# Patient Record
Sex: MALE | Race: WHITE | Employment: FULL TIME | URBAN - METROPOLITAN AREA
[De-identification: names, ages, dates, MRNs, and addresses within clinical notes are randomized per-mention and may not be internally consistent; named-entity substitution may affect disease eponyms.]

---

## 2017-01-05 ENCOUNTER — OFFICE VISIT (OUTPATIENT)
Dept: PSYCHIATRY | Facility: CLINIC | Age: 37
End: 2017-01-05
Attending: CLINICAL NURSE SPECIALIST
Payer: COMMERCIAL

## 2017-01-05 VITALS
HEART RATE: 62 BPM | SYSTOLIC BLOOD PRESSURE: 129 MMHG | DIASTOLIC BLOOD PRESSURE: 85 MMHG | BODY MASS INDEX: 28.9 KG/M2 | WEIGHT: 219 LBS

## 2017-01-05 DIAGNOSIS — F45.9 SOMATIC PREOCCUPATION: ICD-10-CM

## 2017-01-05 DIAGNOSIS — F33.2 MAJOR DEPRESSIVE DISORDER, RECURRENT, SEVERE WITHOUT PSYCHOTIC FEATURES (H): Primary | ICD-10-CM

## 2017-01-05 DIAGNOSIS — F41.9 ANXIETY: ICD-10-CM

## 2017-01-05 DIAGNOSIS — F90.0 ADHD (ATTENTION DEFICIT HYPERACTIVITY DISORDER), INATTENTIVE TYPE: ICD-10-CM

## 2017-01-05 RX ORDER — BUPROPION HYDROCHLORIDE 150 MG/1
150 TABLET, EXTENDED RELEASE ORAL EVERY MORNING
Qty: 30 TABLET | Refills: 5 | Status: SHIPPED | OUTPATIENT
Start: 2017-01-05 | End: 2017-06-15

## 2017-01-05 RX ORDER — OLANZAPINE 2.5 MG/1
2.5 TABLET, FILM COATED ORAL AT BEDTIME
Qty: 30 TABLET | Refills: 1 | Status: SHIPPED | OUTPATIENT
Start: 2017-01-05 | End: 2017-02-02

## 2017-01-05 NOTE — PROGRESS NOTES
Outpatient Psychiatry Progress Note     Provider: ABBY Contreras CNS  Date: 2017  Service:  Medication follow up with counseling.   Patient Identification: Brian Gaitan  : 1980   MRN: [id number]    Brian Gaitan is a 36 year old year old male who presents for ongoing psychiatric care.  Brian Gaitan was last seen in clinic on 12/15/16.   At that time,   Assessment & Plan       Brian Gaitan is seen today for follow up and reports overall continues to be depressed, lethargic, with difficulty processing but also has some improvement in anxiety with less somatic focus and internal focus.  He is wondering about pursuing low testosterone treatment.    Diagnosis  Axis 1: Major Depression, Recurrent severe without psychosis, Anxiety, Somatic Preoccupations, History of ADHD   Axis 2: none  Axis 3: See problem list in the medical record    Plan:  Medication: Decrease Trazodone to one half tablet for 2 weeks then if no worsening of sleep discontinue. Continue all other medications.  OTC Recommendations: Twice Daily Multiple Vitamin  Lab Orders:  none  Referrals: none  Release of Information: none  Future Treatment Considerations:addition of Fluoxetine  Return for Follow Up:2 weeks               2017  Today Brian reports he was with family for [employer] and a few friends in on new years.  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  He stays with family about 70% of the time.   Got a  fitbit for [employer] and it says he has been sleeping 9 hours. He can't tell that he sleeps like this though.  Not feeling that he is sleeping worse since off Trazodone.  If he sees something sad can get teary.  The increase in emotions seems to have been gradual onset.   Still has trouble getting up in the am. Sometimes sleeps into noon.  Side effects of medication include: no change  Psychiatric Review of Systems:  The patient endorses symptoms of depression: In the last 2 weeks several days appetite  disturbance, restless/lethargy. More than 1/2 days feeling failure, SI but denies plan or intent. Nearly everyday anhedonia, feeling depressed, sleep disturbance, fatigue, concentration problems.  He  patient endorses symptoms of anxiety : less somatic focused but still has this. He is considering treatment for low testosterone.  He endorses symptoms of pierre including none.    He endorses symptoms of psychosis including no psychotic symptoms.       Review of Medical Systems:  Sleep: see subjective  Energy: continued low  Concentration: continued decrease  Appetite: eating more  GI Concerns: none  Cardiac concerns: none  Neurological concerns: none  Other medical concerns: no new concerns  Current Substance Use:  Alcohol:denies frequent use or abuse  Other drugs:denies  Caffeine:not discussed  Nicotine: none  Past Medical History:   Past Medical History   Diagnosis Date     Perforation of tympanic membrane, unspecified age 23     Generalized anxiety disorder      Insomnia      Major depressive disorder, recurrent episode, moderate (H) 9/1/2011     Bundle branch block, right      Chronic back pain      Seasonal allergies      Patient Active Problem List   Diagnosis     CARDIOVASCULAR SCREENING; LDL GOAL LESS THAN 160     Sleep problems     Cognitive complaints     ADHD (attention deficit hyperactivity disorder), inattentive type     Anxiety     Major depressive disorder, recurrent, severe without psychotic features (H)     Right bundle branch block (RBBB) on electrocardiogram (ECG)     Somatic preoccupation     Sinus congestion     Suicidal ideation       Allergies:   Allergies   Allergen Reactions     Penicillins Unknown          Current Medications     Current Outpatient Prescriptions Ordered in Epic   Medication Sig Dispense Refill     OLANZapine (ZYPREXA) 5 MG tablet Take 1 tablet (5 mg) by mouth At Bedtime 30 tablet 5     lithium (ESKALITH/LITHOBID) 300 MG CR tablet Take 3 tablets (900 mg) by mouth daily With  "food. 90 tablet 5     prazosin (MINIPRESS) 5 MG capsule Take 1 capsule (5 mg) by mouth At Bedtime 30 capsule 5     traZODone (DESYREL) 50 MG tablet Take one half to one tablet by mouth an hour before bedtime 30 tablet 0     buPROPion (WELLBUTRIN SR) 150 MG 12 hr tablet Take 1 tablet (150 mg) by mouth every morning 30 tablet 0     propranolol (INDERAL) 20 MG tablet Take 1 tablet (20 mg) by mouth 2 times daily       pseudoePHEDrine (SUDAFED) 30 MG tablet Take 1 tablet (30 mg) by mouth as needed for congestion       acetaminophen (TYLENOL) 325 MG tablet Take 2 tablets (650 mg) by mouth every 4 hours as needed for mild pain or headaches 100 tablet 0     miconazole (MICATIN) 2 % cream Apply topically 2 times daily as needed (rash) 45 g 0     cholecalciferol 1000 UNITS TABS Take 1,000 Units by mouth daily 30 tablet 0     fluticasone (FLONASE) 50 MCG/ACT nasal spray Spray 2 sprays into both nostrils daily 16 g 5     fexofenadine-pseudoePHEDrine (ALLEGRA-D)  MG per tablet Take 1 tablet by mouth 2 times daily       MAGNESIUM GLYCINATE PLUS PO Take 400 mg by mouth daily       Multiple Vitamins-Minerals (MULTIVITAMIN PO) Take by mouth daily       [DISCONTINUED] QUEtiapine (SEROQUEL) 25 MG tablet Take 2-4 tablets at bedtime to address insomnia and sleep concerns  Take 1 tablet twice daily as needed for anxiety management 100 tablet 0     Omega-3 Fatty Acids (OMEGA-3 FISH OIL PO) Take 1 g by mouth daily       No current Epic-ordered facility-administered medications on file.        Mental Status Exam     Appearance:  Casually dressed and Adequately groomed  Behavior/relationship to examiner/demeanor:  Cooperative  Orientation: Oriented to person, place, time and situation  Psychomotor: normal form  Speech Rate:  Normal  Speech Spontaneity:  Normal  Mood:  \"still depressed\"  Affect:  Appropriate/mood-congruent  Thought Process (Associations):  Goal directed and Circumstantial  Thought Content:  no overt psychosis, patient " does not appear to be responding to internal stimuli, Suicidal ideation and denies suicidal intent or plan  Abnormal Perception:  None  Attention/Concentration:  Fair  Language:  Intact  Insight:  Adequate  Judgment:  Adequate for safety      Results     Vital signs: /85 mmHg  Pulse 62  Wt 99.338 kg (219 lb)    Laboratory Data:  reviewed previous    Assessment & Plan      Brian Gaitan is seen today for follow up and reports he continues to have a lot of difficulty but is also thinking more of things he can do to have more structure. Has concerns about side effects of lithium but we discussed how this medication may also be helping him. Agrees with plan to further decrease Zyprexa to see if easier to get going in the am and more energy during the day.     Diagnosis  Axis 1: Major Depression, Recurrent, Severe without psychotic features, Anxiety, ADHD inattentive, Somatic Preoccupations  Axis 2: none  Axis 3: See problem list in the medical record    Plan:  Medication: Decrease Zyprexa to 2.5mg and continue all other medications  OTC Recommendations: none  Lab Orders: Continues   Referrals: none  Release of Information: none  Future Treatment Considerations:per symptoms  Return for Follow Up: 4 weeks   The risks, benefits, alternatives and side effects have been discussed and are understood by the patient. The patient understands the risks of using street drugs or alcohol. There are no medical contraindications, the patient agrees to treatment, and has the capacity to do so. The patient understands to call 911 or come to the nearest ED if life threatening or urgent symptoms present.  Over 50% of this time was spent counseling the patient and/or coordinating care regarding review of social and occupational functioning.  In addition patient was counseled on health and wellness practices to augment medication treatment of symptoms. See note for details.    Yenny Call, APRN CNS 1/5/2017

## 2017-01-07 ASSESSMENT — PATIENT HEALTH QUESTIONNAIRE - PHQ9: SUM OF ALL RESPONSES TO PHQ QUESTIONS 1-9: 21

## 2017-02-02 ENCOUNTER — OFFICE VISIT (OUTPATIENT)
Dept: PSYCHIATRY | Facility: CLINIC | Age: 37
End: 2017-02-02
Attending: CLINICAL NURSE SPECIALIST
Payer: COMMERCIAL

## 2017-02-02 VITALS
BODY MASS INDEX: 28.31 KG/M2 | DIASTOLIC BLOOD PRESSURE: 75 MMHG | SYSTOLIC BLOOD PRESSURE: 112 MMHG | HEART RATE: 67 BPM | WEIGHT: 214.5 LBS

## 2017-02-02 DIAGNOSIS — F45.9 SOMATIC PREOCCUPATION: ICD-10-CM

## 2017-02-02 DIAGNOSIS — F33.2 MAJOR DEPRESSIVE DISORDER, RECURRENT, SEVERE WITHOUT PSYCHOTIC FEATURES (H): ICD-10-CM

## 2017-02-02 DIAGNOSIS — F41.9 ANXIETY: Primary | ICD-10-CM

## 2017-02-02 NOTE — PROGRESS NOTES
Outpatient Psychiatry Progress Note     Provider: ABBY Contreras CNS  Date: 2017  Service:  Medication follow up with counseling.   Patient Identification: Brian Gaitan  : 1980   MRN: [id number]    Brian Gaitan is a 36 year old year old male who presents for ongoing psychiatric care.  Brian Gaitan was last seen in clinic on 17.   At that time,   Assessment & Plan       Brian Gaitan is seen today for follow up and reports he continues to have a lot of difficulty but is also thinking more of things he can do to have more structure. Has concerns about side effects of lithium but we discussed how this medication may also be helping him. Agrees with plan to further decrease Zyprexa to see if easier to get going in the am and more energy during the day.     Diagnosis  Axis 1: Major Depression, Recurrent, Severe without psychotic features, Anxiety, ADHD inattentive, Somatic Preoccupations  Axis 2: none  Axis 3: See problem list in the medical record    Plan:  Medication: Decrease Zyprexa to 2.5mg and continue all other medications  OTC Recommendations: none  Lab Orders: Continues   Referrals: none  Release of Information: none  Future Treatment Considerations:per symptoms  Return for Follow Up: 4 weeks               2017  Today Brian reports that he is feeling about the same. Sleep is unchanged, but continues to have difficulty getting out of bed. Gets out of bed around noon. Spends 70 to 80 % of time at his parents.  Continues to see Katie but only every 2 weeks due to finances. Due to back pain has been seeing chiropractor twice a week due to increase pain. He did start volunteering at an animal shelter but only about an hour every few weeks. Has been ice fishing. Continue to have difficulties with concentration.  Random thoughts of suicidal ideas but denies intent.   Side effects of medication include: no new side effects    Psychiatric Review of Systems:  The patient endorses  symptoms of depression: decreased energy, concentration, avoidance  In the last 2 weeks per PHQ 9 several days feelings of failure, restless/lethargy.  Nearly everyday anhedonia, feeling depressed, sleep disturbance, fatigue, concentration problems. SI but denies plan or intent.  He  patient endorses symptoms of anxiety : more somatic symptoms  He endorses symptoms of pierre including denies.    He endorses symptoms of psychosis including no psychotic symptoms.       Review of Medical Systems:  Sleep: not sure how much he is getting  Energy: low  Concentration: continued problems  Appetite: trying to   GI Concerns: denies  Cardiac concerns: denies  Neurological concerns: denies  Other medical concerns: denies    Current Substance Use:  Alcohol:occasional use  Other drugs:denies  Caffeine:denies  Nicotine: denies    Past Medical History:   Past Medical History   Diagnosis Date     Perforation of tympanic membrane, unspecified age 23     Generalized anxiety disorder      Insomnia      Major depressive disorder, recurrent episode, moderate (H) 9/1/2011     Bundle branch block, right      Chronic back pain      Seasonal allergies      Patient Active Problem List   Diagnosis     CARDIOVASCULAR SCREENING; LDL GOAL LESS THAN 160     Sleep problems     Cognitive complaints     ADHD (attention deficit hyperactivity disorder), inattentive type     Anxiety     Major depressive disorder, recurrent, severe without psychotic features (H)     Right bundle branch block (RBBB) on electrocardiogram (ECG)     Somatic preoccupation     Sinus congestion     Suicidal ideation       Allergies:   Allergies   Allergen Reactions     Penicillins Unknown          Current Medications     Current Outpatient Prescriptions Ordered in [facility]   Medication Sig Dispense Refill     OLANZapine (ZYPREXA) 2.5 MG tablet Take 1 tablet (2.5 mg) by mouth At Bedtime 30 tablet 1     buPROPion (WELLBUTRIN SR) 150 MG 12 hr tablet Take 1 tablet (150 mg) by mouth  "every morning 30 tablet 5     lithium (ESKALITH/LITHOBID) 300 MG CR tablet Take 3 tablets (900 mg) by mouth daily With food. 90 tablet 5     prazosin (MINIPRESS) 5 MG capsule Take 1 capsule (5 mg) by mouth At Bedtime 30 capsule 5     propranolol (INDERAL) 20 MG tablet Take 1 tablet (20 mg) by mouth 2 times daily       pseudoePHEDrine (SUDAFED) 30 MG tablet Take 1 tablet (30 mg) by mouth as needed for congestion       acetaminophen (TYLENOL) 325 MG tablet Take 2 tablets (650 mg) by mouth every 4 hours as needed for mild pain or headaches 100 tablet 0     miconazole (MICATIN) 2 % cream Apply topically 2 times daily as needed (rash) 45 g 0     cholecalciferol 1000 UNITS TABS Take 1,000 Units by mouth daily 30 tablet 0     fluticasone (FLONASE) 50 MCG/ACT nasal spray Spray 2 sprays into both nostrils daily 16 g 5     fexofenadine-pseudoePHEDrine (ALLEGRA-D)  MG per tablet Take 1 tablet by mouth 2 times daily       MAGNESIUM GLYCINATE PLUS PO Take 400 mg by mouth daily       Multiple Vitamins-Minerals (MULTIVITAMIN PO) Take by mouth daily       [DISCONTINUED] QUEtiapine (SEROQUEL) 25 MG tablet Take 2-4 tablets at bedtime to address insomnia and sleep concerns  Take 1 tablet twice daily as needed for anxiety management 100 tablet 0     Omega-3 Fatty Acids (OMEGA-3 FISH OIL PO) Take 1 g by mouth daily       No current Epic-ordered facility-administered medications on file.        Mental Status Exam     Appearance:  Casually dressed  Behavior/relationship to examiner/demeanor:  Cooperative and Pleasant  Orientation: Oriented to person, place, time and situation  Psychomotor: normal form  Speech Rate:  Normal  Speech Spontaneity:  Normal  Mood:  \"still not good\"  Affect:  Appropriate/mood-congruent  Thought Process (Associations):  Circumstantial  Thought Content:  no overt psychosis, denies current suicidal ideation, intent or thoughts and patient does not appear to be responding to internal stimuli  Abnormal " Perception:  None  Attention/Concentration:  Fair  Language:  Intact  Insight:  Adequate  Judgment:  Adequate for safety      Results     Vital signs: /75 mmHg  Pulse 67  Wt 97.297 kg (214 lb 8 oz)    Laboratory Data:  no new results reviewed since last visit    Assessment & Plan      Brian Gaitan is seen today for follow up and reports continued low energy, difficulties getting out of bed.  Pt is engaging in some outside activities, but encouraged to consider Yoga, walking dog to increase activities outside of home.  Pt agrees to discontinue Olanzapine to see if there is any changes in energy.  If symptoms of depressed mood worsens, may restart Prozac in the future.    Diagnosis  Axis 1: Major Depression, Recurrent, Severe without psychotic features, Anxiety, ADHD inattentive, Somatic Preoccupations  Axis 2: none  Axis 3: See problem list in the medical record    Plan:  Medication: Discontinue Zyprexa. Continue all other medications  OTC Recommendations: none  Lab Orders:  none  Referrals: none  Release of Information: none  Future Treatment Considerations:addition of Fluoxetine  Return for Follow Up: 4 weeks   The risks, benefits, alternatives and side effects have been discussed and are understood by the patient. The patient understands the risks of using street drugs or alcohol. There are no medical contraindications, the patient agrees to treatment, and has the capacity to do so. The patient understands to call 911 or come to the nearest ED if life threatening or urgent symptoms present.  Over 50% of this time was spent counseling the patient and/or coordinating care regarding review of social and occupational functioning.  In addition patient was counseled on health and wellness practices to augment medication treatment of symptoms. See note for details.    Nichelle Slater, Psychiatric/Mental Health Nurse Practitioner Student, 2/4/2017    Yenny Call, APRN CNS 2/2/2017

## 2017-02-09 ASSESSMENT — PATIENT HEALTH QUESTIONNAIRE - PHQ9: SUM OF ALL RESPONSES TO PHQ QUESTIONS 1-9: 20

## 2017-03-02 ENCOUNTER — OFFICE VISIT (OUTPATIENT)
Dept: PSYCHIATRY | Facility: CLINIC | Age: 37
End: 2017-03-02
Attending: CLINICAL NURSE SPECIALIST
Payer: COMMERCIAL

## 2017-03-02 VITALS
DIASTOLIC BLOOD PRESSURE: 78 MMHG | SYSTOLIC BLOOD PRESSURE: 130 MMHG | BODY MASS INDEX: 28.39 KG/M2 | WEIGHT: 215.2 LBS | HEART RATE: 65 BPM

## 2017-03-02 DIAGNOSIS — F95.9 TIC DISORDER: ICD-10-CM

## 2017-03-02 DIAGNOSIS — F41.9 ANXIETY: ICD-10-CM

## 2017-03-02 DIAGNOSIS — F33.2 MAJOR DEPRESSIVE DISORDER, RECURRENT, SEVERE WITHOUT PSYCHOTIC FEATURES (H): ICD-10-CM

## 2017-03-02 DIAGNOSIS — F90.0 ADHD (ATTENTION DEFICIT HYPERACTIVITY DISORDER), INATTENTIVE TYPE: Primary | ICD-10-CM

## 2017-03-02 DIAGNOSIS — F45.9 SOMATIC PREOCCUPATION: ICD-10-CM

## 2017-03-02 PROCEDURE — 99212 OFFICE O/P EST SF 10 MIN: CPT | Mod: ZF

## 2017-03-02 RX ORDER — OLANZAPINE 5 MG/1
5 TABLET ORAL AT BEDTIME
Qty: 30 TABLET | Refills: 5 | Status: SHIPPED | OUTPATIENT
Start: 2017-03-02 | End: 2017-08-24

## 2017-03-02 NOTE — PROGRESS NOTES
"  Outpatient Psychiatry Progress Note     Provider: ABBY Contreras CNS  Date: 3/2/2017  Service:  Medication follow up with counseling.   Patient Identification: Brian Gaitan  : 1980   MRN: [id number]    Brian Gaitan is a 36 year old year old male who presents for ongoing psychiatric care.  Brian Gaitan was last seen in clinic on 17.   At that time,   Assessment & Plan      Brian Gaitan is seen today for follow up and reports continued low energy, difficulties getting out of bed. Pt is engaging in some outside activities, but encouraged to consider Yoga, walking dog to increase activities outside of home. Pt agrees to discontinue Olanzapine to see if there is any changes in energy. If symptoms of depressed mood worsens, may restart Prozac in the future.     Diagnosis  Axis 1: Major Depression, Recurrent, Severe without psychotic features, Anxiety, ADHD inattentive, Somatic Preoccupations  Axis 2: none  Axis 3: See problem list in the medical record     Plan:  Medication: Discontinue Zyprexa. Continue all other medications  OTC Recommendations: none  Lab Orders: none  Referrals: none  Release of Information: none  Future Treatment Considerations:addition of Fluoxetine  Return for Follow Up: 4 weeksb      3/2/2017  Today Brian reports continued depressed mood and isolation.  Pt reports tearfully having more suicidal thoughts and is feeling \" tired off it all\".  Denies any intent.  Continues to have back pain. Feels safer staying at his parent's home in [place].  No improvement in sleep, fatigue,motivation or energy. Goes to bed around between midnight and 2 am, usually watching videos.  Helping his father work on the basement since it been getting water.  Pt reports he feels better staying with family, but also has ambivalent feeling/increased worthlessness due to living with family at his age.  Also, still volunteer work at the animal shelter.  However, reports not hanging out with friends " much and also noticed not cooking for himself due to decrease in motivation.  Pt also reports some obsessive thoughts about not being well.  Since off Olanzapine has been getting hot flash like symtpoms; hot, sweaty and other times cold and can't get warm.     Reports increased financial stress due to accumulating health care cost. Appointments here are costing $100 until he meets his out of pocket expenses. Wasn't able to get in with therapist this week due to her schedule, but this is additional financial stress.      Side effects of medication include: hot flash like symptoms since being off of Olanzapine    Psychiatric Review of Systems:  The patient endorses symptoms of depression: In the last 2 weeks per PHQ 9 several days several day appetite disturbance, restless/lethargy. More than 1/2 days feelings of failure.  Nearly everyday anhedonia, feeling depressed, sleep disturbance, fatigue, concentration problems, SI but denies plan or intent.   He  patient endorses symptoms of anxiety : obsessive thoughts about not being well  He endorses symptoms of pierre including denies.    He endorses symptoms of psychosis including no psychotic symptoms.       Review of Medical Systems:  Sleep: Goes to bed between midnight and 2 am.  Energy: decreased  Concentration: no change  Appetite: decreased  GI Concerns: denies  Cardiac concerns: denies  Neurological concerns: denies  Other medical concerns: denies    Current Substance Use:  Alcohol:no change  Other drugs:denies  Caffeine:denies  Nicotine: denies    Past Medical History:   Past Medical History   Diagnosis Date     Bundle branch block, right      Chronic back pain      Generalized anxiety disorder      Insomnia      Major depressive disorder, recurrent episode, moderate (H) 9/1/2011     Perforation of tympanic membrane, unspecified age 23     Seasonal allergies      Patient Active Problem List   Diagnosis     CARDIOVASCULAR SCREENING; LDL GOAL LESS THAN 160     Sleep  problems     Cognitive complaints     ADHD (attention deficit hyperactivity disorder), inattentive type     Anxiety     Major depressive disorder, recurrent, severe without psychotic features (H)     Right bundle branch block (RBBB) on electrocardiogram (ECG)     Somatic preoccupation     Sinus congestion     Suicidal ideation       Allergies:   Allergies   Allergen Reactions     Penicillins Unknown          Current Medications     Current Outpatient Prescriptions Ordered in [facility]   Medication Sig Dispense Refill     buPROPion (WELLBUTRIN SR) 150 MG 12 hr tablet Take 1 tablet (150 mg) by mouth every morning 30 tablet 5     lithium (ESKALITH/LITHOBID) 300 MG CR tablet Take 3 tablets (900 mg) by mouth daily With food. 90 tablet 5     prazosin (MINIPRESS) 5 MG capsule Take 1 capsule (5 mg) by mouth At Bedtime 30 capsule 5     propranolol (INDERAL) 20 MG tablet Take 1 tablet (20 mg) by mouth 2 times daily       pseudoePHEDrine (SUDAFED) 30 MG tablet Take 1 tablet (30 mg) by mouth as needed for congestion       acetaminophen (TYLENOL) 325 MG tablet Take 2 tablets (650 mg) by mouth every 4 hours as needed for mild pain or headaches 100 tablet 0     miconazole (MICATIN) 2 % cream Apply topically 2 times daily as needed (rash) 45 g 0     cholecalciferol 1000 UNITS TABS Take 1,000 Units by mouth daily 30 tablet 0     fluticasone (FLONASE) 50 MCG/ACT nasal spray Spray 2 sprays into both nostrils daily 16 g 5     [DISCONTINUED] QUEtiapine (SEROQUEL) 25 MG tablet Take 2-4 tablets at bedtime to address insomnia and sleep concerns  Take 1 tablet twice daily as needed for anxiety management 100 tablet 0     No current Epic-ordered facility-administered medications on file.         Mental Status Exam     Appearance:  Casually dressed and Adequately groomed  Behavior/relationship to examiner/demeanor:  Cooperative, Engaged and Pleasant  Orientation: Oriented to person, place, time and situation  Psychomotor: normal form  Speech Rate:   Normal  Speech Spontaneity:  Normal  Mood:  depressed  Affect:  Appropriate/mood-congruent, subdued and tearful at times  Thought Process (Associations):  Circumstantial  Thought Content:  no overt psychosis, denies current suicidal ideation, intent or thoughts and patient does not appear to be responding to internal stimuli  Abnormal Perception:  None  Attention/Concentration:  Normal  Language:  Intact  Insight:  Adequate  Judgment:  Adequate for safety      Results     Vital signs: /78  Pulse 65  Wt 97.6 kg (215 lb 3.2 oz)  BMI 28.39 kg/m2    Laboratory Data:  no new data reviewed today    Assessment & Plan      Brian Gaitan is seen today for follow up and reports continued low energy, difficulties with sleeping and increased depressed mood with occasional suicide ideation without intent.  Pt feels safe staying with his family and wants to continue to do so despite of some ambivalence.  Strongly encouraged pt to stay with family as he feels safer with them at this time.  Pt is also reporting increased financial stress due to frequent health visits.  Encouraged to keep in touch via My Chart messages in one week to follow up while follow up face to face in 4 weeks.  Pt agrees to restart Zyprexa as symptoms worsened after discontinuing it.  Pt has tried numerous medications and ECT in the past without much improvement, as pt has not tried Lamictal, Prozac or Luvox, may consider them in the future.    Diagnosis  Axis 1: Major Depression, Recurrent, Severe without psychotic features, Anxiety, ADHD inattentive, Somatic Preoccupations  Axis 2: none  Axis 3: See problem list in the medical record      Plan:  Medication: Restart Zyprexa 5mg and continue all other medications  OTC Recommendations: none  Lab Orders:  None at this time  Referrals: none  Release of Information: none  Future Treatment Considerations:Lamictal, Prozac, luvox  Return for Follow Up:4 weeks but patient will send My Chart message in one week  to consider Lamictal, Prozac, Luvox   The risks, benefits, alternatives and side effects have been discussed and are understood by the patient. The patient understands the risks of using street drugs or alcohol. There are no medical contraindications, the patient agrees to treatment, and has the capacity to do so. The patient understands to call 911 or come to the nearest ED if life threatening or urgent symptoms present.  Over 50% of this time was spent counseling the patient and/or coordinating care regarding review of social and occupational functioning.  In addition patient was counseled on health and wellness practices to augment medication treatment of symptoms. See note for details.    Nichelle Slater, Psychiatric/Mental Health Nurse Practitioner Student, 3/3/2017      Yenny Call, APRN CNS 3/2/2017

## 2017-03-02 NOTE — MR AVS SNAPSHOT
After Visit Summary   3/2/2017    Brian Gaitan    MRN: [id number]           Patient Information     Date Of Birth          1980        Visit Information        Provider Department      3/2/2017 5:15 PM Yenny Call APRN [facility]        Today's Diagnoses     ADHD (attention deficit hyperactivity disorder), inattentive type    -  1    Anxiety        Major depressive disorder, recurrent, severe without psychotic features (H)        Somatic preoccupation        Tic disorder        Major depressive disorder, recurrent, severe without psychotic features           Follow-ups after your visit        Follow-up notes from your care team     Return in about 4 weeks (around 3/30/2017).      Who to contact     Please call your clinic at [phone] to:    Ask questions about your health    Make or cancel appointments    Discuss your medicines    Learn about your test results    Speak to your doctor   If you have compliments or concerns about an experience at your clinic, or if you wish to file a complaint, please contact [facility] Patient Relations at [phone] or email us at [email or web address]         Additional Information About Your Visit        MyChart Information     [employer] gives you secure access to your electronic health record. If you see a primary care provider, you can also send messages to your care team and make appointments. If you have questions, please call your primary care clinic.  If you do not have a primary care provider, please call [phone] and they will assist you.      [employer] is an electronic gateway that provides easy, online access to your medical records. With [employer], you can request a clinic appointment, read your test results, renew a prescription or communicate with your care team.     To access your existing account, please contact your [facility] or call [phone] for  assistance.        Care EveryWhere ID     This is your Care EveryWhere ID. This could be used by other organizations to access your [place] medical records  [id number]        Your Vitals Were     Pulse BMI (Body Mass Index)                65 28.39 kg/m2           Blood Pressure from Last 3 Encounters:   03/02/17 130/78   02/02/17 112/75   01/05/17 129/85    Weight from Last 3 Encounters:   03/02/17 97.6 kg (215 lb 3.2 oz)   02/02/17 97.3 kg (214 lb 8 oz)   01/05/17 99.3 kg (219 lb)              Today, you had the following     No orders found for display         Today's Medication Changes          These changes are accurate as of: 3/2/17 11:59 PM.  If you have any questions, ask your nurse or doctor.               Start taking these medicines.        Dose/Directions    OLANZapine 5 MG tablet   Commonly known as:  zyPREXA   Used for:  Major depressive disorder, recurrent, severe without psychotic features (H)   Started by:  Yenny Call APRN CNS        Dose:  5 mg   Take 1 tablet (5 mg) by mouth At Bedtime   Quantity:  30 tablet   Refills:  5            Where to get your medicines      These medications were sent to [facility] [address] [place], [place] [address] [facility] [address] [facility] [address]  [address] [facility] [address], [facility] [address]     Phone:  [phone]     OLANZapine 5 MG tablet                Primary Care Provider Office Phone # Fax #    ABBY Ng [facility] [phone]       [facility] [place] [address] [place] [address]        Thank you!     Thank you for choosing PSYCHIATRY CLINIC  for your care. Our goal is always to provide you with excellent care. Hearing back from our patients is one way we can continue to improve our services. Please take a few minutes to complete the written survey that you may receive in the mail after your visit with us. Thank you!             Your Updated Medication List - Protect others  around you: Learn how to safely use, store and throw away your medicines at www.disposemymeds.org.          This list is accurate as of: 3/2/17 11:59 PM.  Always use your most recent med list.                   Brand Name Dispense Instructions for use    acetaminophen 325 MG tablet    TYLENOL    100 tablet    Take 2 tablets (650 mg) by mouth every 4 hours as needed for mild pain or headaches       buPROPion 150 MG 12 hr tablet    WELLBUTRIN SR    30 tablet    Take 1 tablet (150 mg) by mouth every morning       cholecalciferol 1000 UNITS Tabs     30 tablet    Take 1,000 Units by mouth daily       fluticasone 50 MCG/ACT spray    FLONASE    16 g    Spray 2 sprays into both nostrils daily       lithium 300 MG CR tablet    ESKALITH/LITHOBID    90 tablet    Take 3 tablets (900 mg) by mouth daily With food.       miconazole 2 % cream    MICATIN    45 g    Apply topically 2 times daily as needed (rash)       OLANZapine 5 MG tablet    zyPREXA    30 tablet    Take 1 tablet (5 mg) by mouth At Bedtime       prazosin 5 MG capsule    MINIPRESS    30 capsule    Take 1 capsule (5 mg) by mouth At Bedtime       propranolol 20 MG tablet    INDERAL     Take 1 tablet (20 mg) by mouth 2 times daily       pseudoePHEDrine 30 MG tablet    SUDAFED     Take 1 tablet (30 mg) by mouth as needed for congestion

## 2017-03-02 NOTE — NURSING NOTE
Chief Complaint   Patient presents with     RECHECK     Anxiety, ADHD     Reviewed allergies, smoking status, and pharmacy preference  Administered abuse screening questions   Obtained weight, blood pressure and heart rate   Soraya Fontanez LPN    During IPV screening, Patient stated he has feelings of harming himself daily. Will notify provider. Soraya Fontanez LPN

## 2017-03-10 ASSESSMENT — PATIENT HEALTH QUESTIONNAIRE - PHQ9: SUM OF ALL RESPONSES TO PHQ QUESTIONS 1-9: 22

## 2017-03-13 ENCOUNTER — CARE COORDINATION (OUTPATIENT)
Dept: PSYCHIATRY | Facility: CLINIC | Age: 37
End: 2017-03-13

## 2017-03-13 NOTE — PROGRESS NOTES
Appt history:  LS  3/2/17  RTC  4 weeks  PEN  not scheduled    At 3/2/17 appt:  -Restart Zyprexa 5mg and continue all other medications  -Return in 4 weeks but patient will send My Chart message in one week to consider Lamictal, Prozac, Luvox    Call placed to pt:  -Pt has not contacted clinic since last appt  -Writer sent [employer] on 3/10/17 which pt has not read  -LVM at [phone] requesting pt call or respond to MyChart msg with an update  -Clinic number provided if pt would prefer to call  -Will try reaching pt tomorrow if no c/b.

## 2017-03-14 NOTE — PROGRESS NOTES
-Attempted to reach pt again today but no answer.  -LVM requesting call back or for pt to respond to MyChart msg  -Clinic number provided for c/b  -Will update provider.

## 2017-03-15 ENCOUNTER — TELEPHONE (OUTPATIENT)
Dept: PSYCHIATRY | Facility: CLINIC | Age: 37
End: 2017-03-15

## 2017-03-15 NOTE — TELEPHONE ENCOUNTER
----- Message from Vanda Paredes RN sent at 3/15/2017  8:42 AM CDT -----  Contact: [phone]      ----- Message -----     From: Devika Mcknight     Sent: 3/14/2017   4:35 PM       To: PAU Mcclure/Sonja     Patient is caller. Says that he is returning a call to the nurse.

## 2017-03-15 NOTE — TELEPHONE ENCOUNTER
"Spoke with patient. He stated \"Zyprexa is helping a little.\" He admits to daily SI but without plan. \"If I wasn't staying with my parents right now it would be more of a concern.\" He rated depression 7-8/10, anxiety 6-7/10. 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 His goal is to walk everyday . His parents have recommended he go to a physical therapist due to morning back pain. He denied recent injury but stated \"I am probably just not as active as I used to be. I used to work out.\".His sleep quality is \"really bad. I am not tired at midnight or 1:00 A.M and I don't  really know when I fall asleep or wake. I don't think I fall asleep until 4:00 a.m.- 9:00 a.m.     He stated he needs to speak with Yenny about what the plan is. \"I don't know if I really need to be on all these medications.\" He complained about the cost of appts.and medications stating \"I am on disability. This costs a lot.\"    He believes he will be safe until he sees Yenny on 4/6/17.    Will route to Yenny.      "

## 2017-03-15 NOTE — TELEPHONE ENCOUNTER
----- Message from Vanda Paredes RN sent at 3/15/2017  8:42 AM CDT -----  Contact: [phone]      ----- Message -----     From: Devika Mcknight     Sent: 3/14/2017   4:35 PM       To: PAU Mcclure/Sonja     Patient is caller. Says that he is returning a call to the nurse.

## 2017-03-15 NOTE — TELEPHONE ENCOUNTER
----- Message from Vanda Paredes RN sent at 3/15/2017  8:42 AM CDT -----  Contact: [phone]      ----- Message -----     From: Devika Mcknight     Sent: 3/14/2017   4:35 PM       To: PAU Mcclure/Sonja     Patient is caller. Says that he is returning a call to the nurse.

## 2017-03-16 NOTE — TELEPHONE ENCOUNTER
Call to patient and discussed possible medication changes. If discontinuing any medication at this time would recommend taper of Propranolol. Discussed consider other medication for depression - Luvox,may increase sedation with other medications. Prozac may increase risk of serotonin syndrome with lithium. Could consider Fetzima which doesn't interact with other medications.   Patient will consider and get back to me tomorrow.  Yenny Call CNS, APRN

## 2017-03-20 ENCOUNTER — TELEPHONE (OUTPATIENT)
Dept: PSYCHIATRY | Facility: CLINIC | Age: 37
End: 2017-03-20

## 2017-03-20 DIAGNOSIS — F33.2 MAJOR DEPRESSIVE DISORDER, RECURRENT, SEVERE WITHOUT PSYCHOTIC FEATURES (H): Primary | ICD-10-CM

## 2017-03-20 DIAGNOSIS — F41.9 ANXIETY: ICD-10-CM

## 2017-03-20 DIAGNOSIS — F45.9 SOMATIC PREOCCUPATION: ICD-10-CM

## 2017-03-20 NOTE — TELEPHONE ENCOUNTER
----- Message from Rocio Gorman sent at 3/17/2017  4:39 PM CDT -----  Regarding: Patient Call  Contact: [phone]  Brian Palmer is calling back regarding some medication recommendations Yenny had asked him to consider.  He said to tell her that he does not want to take Fetzima, but is open to either Prozac or Luvox - whichever she thinks is best.  If she would like to send in a prescription to his pharmacy - [facility] - he would like a call back with instructions.  The best number to reach him is [phone].  It is ok to leave a detailed message.     Thanks,  Rocio

## 2017-03-21 RX ORDER — FLUVOXAMINE MALEATE 50 MG
50 TABLET ORAL AT BEDTIME
Qty: 30 TABLET | Refills: 0 | Status: SHIPPED | OUTPATIENT
Start: 2017-03-21 | End: 2017-04-06 | Stop reason: DRUGHIGH

## 2017-03-21 NOTE — TELEPHONE ENCOUNTER
Decrease Propranolol to 20mg in the am and 10mg at bedtime for one week then decrease to 10mg twice a day for one week then discontinue bedtime dose and continue 10mg in the am for one week then discontinue.    Continue all other medications and add Luvox 50mg at bedtime.  Yenny Call CNS, APRN

## 2017-03-23 ENCOUNTER — TELEPHONE (OUTPATIENT)
Dept: BEHAVIORAL HEALTH | Facility: CLINIC | Age: 37
End: 2017-03-23

## 2017-03-23 NOTE — TELEPHONE ENCOUNTER
This writer received a call from client requesting to know how many treatment days he attended the adult mental health day treatment program in track 2C schedule so that he can include the information on his tax return. He requested a return phone call from a team member. Plan to return phone call by the end of day.    Ana Maria Ramsay OTR/L

## 2017-03-24 ENCOUNTER — TELEPHONE (OUTPATIENT)
Dept: PSYCHIATRY | Facility: CLINIC | Age: 37
End: 2017-03-24

## 2017-03-24 NOTE — TELEPHONE ENCOUNTER
Returned call to pt with this information and recommendations from provider which pt states understanding of.     Pt updates that he is taking Luvox at bedtime.  This morning felt nauseated but is better now.  No vomiting.  We discuss trying to take medication with a snack at night.  Shared that nausea can be a s/e from this medication and may get better with time.  If symptoms worsen or pt develops vomiting he is encouraged to call back to let us know.  Pt agrees.

## 2017-03-24 NOTE — TELEPHONE ENCOUNTER
----- Message from ABBY Contreras sent at 3/24/2017  2:41 PM CDT -----  Contact: [phone]  One beer is ok but I should have told him and so you can tell him now that Luvox can increase the effect of caffiene so if he drinks it he might notice more intense effect.  Yenny

## 2017-03-24 NOTE — TELEPHONE ENCOUNTER
From: Salome Smith  Sent: 3/24/2017   2:07 PM  To: Vanda Paredes, PAU Torres, would like to know if he can drink a beer while taking the Luvox.    Thanks!

## 2017-04-06 ENCOUNTER — TELEPHONE (OUTPATIENT)
Dept: PSYCHIATRY | Facility: CLINIC | Age: 37
End: 2017-04-06

## 2017-04-06 ENCOUNTER — OFFICE VISIT (OUTPATIENT)
Dept: PSYCHIATRY | Facility: CLINIC | Age: 37
End: 2017-04-06
Attending: CLINICAL NURSE SPECIALIST
Payer: COMMERCIAL

## 2017-04-06 VITALS
SYSTOLIC BLOOD PRESSURE: 139 MMHG | HEART RATE: 65 BPM | BODY MASS INDEX: 28.55 KG/M2 | DIASTOLIC BLOOD PRESSURE: 81 MMHG | WEIGHT: 216.4 LBS

## 2017-04-06 DIAGNOSIS — F41.9 ANXIETY: ICD-10-CM

## 2017-04-06 DIAGNOSIS — F90.0 ADHD (ATTENTION DEFICIT HYPERACTIVITY DISORDER), INATTENTIVE TYPE: Primary | ICD-10-CM

## 2017-04-06 DIAGNOSIS — F33.2 MAJOR DEPRESSIVE DISORDER, RECURRENT, SEVERE WITHOUT PSYCHOTIC FEATURES (H): ICD-10-CM

## 2017-04-06 DIAGNOSIS — F45.9 SOMATIC PREOCCUPATION: ICD-10-CM

## 2017-04-06 PROCEDURE — 99212 OFFICE O/P EST SF 10 MIN: CPT | Mod: ZF

## 2017-04-06 RX ORDER — PROPRANOLOL HYDROCHLORIDE 20 MG/1
10 TABLET ORAL 2 TIMES DAILY
COMMUNITY
Start: 2017-04-06 | End: 2017-06-20 | Stop reason: DRUGHIGH

## 2017-04-06 RX ORDER — FLUVOXAMINE MALEATE 100 MG
100 TABLET ORAL AT BEDTIME
Qty: 30 TABLET | Refills: 1 | Status: SHIPPED | OUTPATIENT
Start: 2017-04-06 | End: 2017-05-04

## 2017-04-06 NOTE — TELEPHONE ENCOUNTER
On 04/06/2017 we received incoming records (Attending Physicians Statement) from [facility]. On 04/06/2017 the original was placed in Yenny Call's folder to complete with note asking for copy when completed. Patient was called back and notified that we have received paperwork. Kaila Hawthorne LPN

## 2017-04-06 NOTE — PROGRESS NOTES
Outpatient Psychiatry Progress Note     Provider: ABBY Contreras CNS  Date: 2017  Service:  Medication follow up with counseling.   Patient Identification: Brian Gaitan  : 1980   MRN: [id number]    Brian Gaitan is a 36 year old year old male who presents for ongoing psychiatric care.  Brian Gaitan was last seen in clinic on 3/2/17.   At that time,   Assessment & Plan      Brian Gaitan is seen today for follow up and reports continued low energy, difficulties with sleeping and increased depressed mood with occasional suicide ideation without intent. Pt feels safe staying with his family and wants to continue to do so despite of some ambivalence. Strongly encouraged pt to stay with family as he feels safer with them at this time. Pt is also reporting increased financial stress due to frequent health visits. Encouraged to keep in touch via My Chart messages in one week to follow up while follow up face to face in 4 weeks. Pt agrees to restart Zyprexa as symptoms worsened after discontinuing it. Pt has tried numerous medications and ECT in the past without much improvement, as pt has not tried Lamictal, Prozac or Luvox, may consider them in the future.     Diagnosis  Axis 1: Major Depression, Recurrent, Severe without psychotic features, Anxiety, ADHD inattentive, Somatic Preoccupations  Axis 2: none  Axis 3: See problem list in the medical record        Plan:  Medication: Restart Zyprexa 5mg and continue all other medications  OTC Recommendations: none  Lab Orders: None at this time  Referrals: none  Release of Information: none  Future Treatment Considerations:Lamictal, Prozac, luvox  Return for Follow Up:4 weeks but patient will send My Chart message in one week to consider Lamictal, Prozac, Luvox    My Chart to patient on 3/21/17:  Hello, here are the directions for the changes we discussed on the phone.   Let me know if any concerns.   Decrease Propranolol to 20mg in the am and 10mg at  "bedtime for one week then decrease to 10mg twice a day for one week then discontinue bedtime dose and continue 10mg in the am for one week then discontinue.     Continue all other medications and add Luvox 50mg at bedtime.   Yenny Call CNS, APRN    04/06/2017  Today Brian reports he feels better since restarting Zyprexa but not sure of any improvement with Luvox.  But states \"there is a brain buzz and maintaining, I guess it's a good thing\" with Luvox.  Started to eat 2 meals a day at least. Also started to driving around the town and considering to ask friends who owns [employer] for possible delivery help. Continues to have a lot of difficulty getting up in the am.     Side effects of medication include: initially had a dry heaving, but that is gone.    Psychiatric Review of Systems:  The patient endorses symptoms of depression: In the last 2 weeks per PHQ 9 several days restless/lethargy. More than 1/2 days anhedonia, feelings of failure.  Nearly everyday feeling depressed, sleep disturbance, fatigue, concentration problems, passive suicidal ideation.  He  patient endorses symptoms of anxiety : improving ruminating thought about not getting well.  He endorses symptoms of pierre including: denies  He endorses symptoms of psychosis including no psychotic symptoms.       Review of Medical Systems:  Sleep: continues to have difficulty getting up in AM  Energy: no significant change  Concentration: no significant change  Appetite: improved  GI Concerns: denies  Cardiac concerns: increased palpitation with Propranolol decrease possible  Neurological concerns: occasional change in gait noted by patient but no falls or dizziness.  Other medical concerns: denies    Current Substance Use:  Alcohol:no change, denies frequent use or abuse  Other drugs:denies  Caffeine:denies  Nicotine: denies    Past Medical History:   Past Medical History:   Diagnosis Date     Bundle branch block, right      Chronic back pain      " Generalized anxiety disorder      Insomnia      Major depressive disorder, recurrent episode, moderate (H) 9/1/2011     Perforation of tympanic membrane, unspecified age 23     Seasonal allergies      Patient Active Problem List   Diagnosis     CARDIOVASCULAR SCREENING; LDL GOAL LESS THAN 160     Sleep problems     Cognitive complaints     ADHD (attention deficit hyperactivity disorder), inattentive type     Anxiety     Major depressive disorder, recurrent, severe without psychotic features (H)     Right bundle branch block (RBBB) on electrocardiogram (ECG)     Somatic preoccupation     Sinus congestion     Suicidal ideation     Tic disorder       Allergies:   Allergies   Allergen Reactions     Penicillins Unknown          Current Medications     Current Outpatient Prescriptions Ordered in [facility]   Medication Sig Dispense Refill     fluvoxaMINE (LUVOX) 50 MG tablet Take 1 tablet (50 mg) by mouth At Bedtime 30 tablet 0     OLANZapine (ZYPREXA) 5 MG tablet Take 1 tablet (5 mg) by mouth At Bedtime 30 tablet 5     buPROPion (WELLBUTRIN SR) 150 MG 12 hr tablet Take 1 tablet (150 mg) by mouth every morning 30 tablet 5     lithium (ESKALITH/LITHOBID) 300 MG CR tablet Take 3 tablets (900 mg) by mouth daily With food. 90 tablet 5     prazosin (MINIPRESS) 5 MG capsule Take 1 capsule (5 mg) by mouth At Bedtime 30 capsule 5     propranolol (INDERAL) 20 MG tablet Take 1 tablet (20 mg) by mouth 2 times daily       pseudoePHEDrine (SUDAFED) 30 MG tablet Take 1 tablet (30 mg) by mouth as needed for congestion       acetaminophen (TYLENOL) 325 MG tablet Take 2 tablets (650 mg) by mouth every 4 hours as needed for mild pain or headaches 100 tablet 0     miconazole (MICATIN) 2 % cream Apply topically 2 times daily as needed (rash) 45 g 0     cholecalciferol 1000 UNITS TABS Take 1,000 Units by mouth daily 30 tablet 0     fluticasone (FLONASE) 50 MCG/ACT nasal spray Spray 2 sprays into both nostrils daily 16 g 5     [DISCONTINUED]  QUEtiapine (SEROQUEL) 25 MG tablet Take 2-4 tablets at bedtime to address insomnia and sleep concerns  Take 1 tablet twice daily as needed for anxiety management 100 tablet 0     No current Epic-ordered facility-administered medications on file.         Mental Status Exam     Appearance:  Casually dressed and Adequately groomed  Behavior/relationship to examiner/demeanor:  Cooperative, Engaged and Pleasant  Orientation: Oriented to person, place, time and situation  Psychomotor: normal form  Speech Rate:  Normal  Speech Spontaneity:  Normal  Mood:  depressed  Affect:  Appropriate/mood-congruent  Thought Process (Associations):  Circumstantial and Rambling  Thought Content:  no overt psychosis, denies suicidal ideation, intent or thoughts and patient does not appear to be responding to internal stimuli  Abnormal Perception:  None  Attention/Concentration:  Normal  Language:  Intact  Insight:  Adequate  Judgment:  Adequate for safety      Results     Vital signs: /81  Pulse 65  Wt 98.2 kg (216 lb 6.4 oz)  BMI 28.55 kg/m2    Laboratory Data:  no new data    Assessment & Plan      Brian Gaitan is seen today for follow up and reports continued difficulty with getting up, but reports some increase in appetite and seemed to have some decrease in social isolation by driving around the town.  As the original plan was to discontinue Propranolol with initiation of Luvox, will discontinue Propranolol this time and further titrate up Luvox to see improvement in symptom reduction.    Diagnosis  Axis 1: Major Depression, Recurrent, Severe without psychotic features, Anxiety, ADHD inattentive, Somatic Preoccupations  Axis 2: none  Axis 3: See problem list in the medical record    Plan:  Medication: Continue taper off of Propranolol. Increase Luvox to 100mg, Continue all other medications  OTC Recommendations: none  Lab Orders:  None   Referrals: none  Release of Information: none  Future Treatment Considerations:Consider  decrease in Lithium to 600mg if mood is improved per patient request due to side effects.  Return for Follow Up:4 weeks    The risks, benefits, alternatives and side effects have been discussed and are understood by the patient. The patient understands the risks of using street drugs or alcohol. There are no medical contraindications, the patient agrees to treatment, and has the capacity to do so. The patient understands to call 911 or come to the nearest ED if life threatening or urgent symptoms present.  Over 50% of this time was spent counseling the patient and/or coordinating care regarding review of social and occupational functioning.  In addition patient was counseled on health and wellness practices to augment medication treatment of symptoms. See note for details.    Nichelle Slater, Psychiatric/Mental Health Nurse Practitioner Student, 4/7/2017      ABBY Contreras CNS 4/6/2017

## 2017-04-06 NOTE — NURSING NOTE
Chief Complaint   Patient presents with     RECHECK     ADHD (attention deficit hyperactivity disorder), inattentive type      Reviewed allergies, smoking status, and pharmacy preference  Administered abuse screening questions-done 3/2/17   Obtained weight, blood pressure and heart rate   Soraya Fontanez LPN

## 2017-04-06 NOTE — MR AVS SNAPSHOT
After Visit Summary   4/6/2017    Brian Gaitan    MRN: [id number]           Patient Information     Date Of Birth          1980        Visit Information        Provider Department      4/6/2017 5:15 PM Yenny Call APRN [facility]        Today's Diagnoses     ADHD (attention deficit hyperactivity disorder), inattentive type    -  1    Anxiety        Major depressive disorder, recurrent, severe without psychotic features (H)        Somatic preoccupation           Follow-ups after your visit        Follow-up notes from your care team     Return in about 4 weeks (around 5/4/2017).      Your next 10 appointments already scheduled     May 04, 2017  5:15 PM CDT   Adult Med Follow UP with ABBY Contreras CNS   Psychiatry Clinic ([facility])    [place] [id number] [address] [phone]              Who to contact     Please call your clinic at [phone] to:    Ask questions about your health    Make or cancel appointments    Discuss your medicines    Learn about your test results    Speak to your doctor   If you have compliments or concerns about an experience at your clinic, or if you wish to file a complaint, please contact [facility] Patient Relations at [phone] or email us at [email or web address]         Additional Information About Your Visit        MyChart Information     [employer]MAN gives you secure access to your electronic health record. If you see a primary care provider, you can also send messages to your care team and make appointments. If you have questions, please call your primary care clinic.  If you do not have a primary care provider, please call [phone] and they will assist you.      INMAN is an electronic gateway that provides easy, online access to your medical records. With [employer], you can request a clinic appointment, read your test  results, renew a prescription or communicate with your care team.     To access your existing account, please contact your [facility] or call [phone] for assistance.        Care EveryWhere ID     This is your Care EveryWhere ID. This could be used by other organizations to access your [facility] medical records  [id number]        Your Vitals Were     Pulse BMI (Body Mass Index)                65 28.55 kg/m2           Blood Pressure from Last 3 Encounters:   04/06/17 139/81   03/02/17 130/78   02/02/17 112/75    Weight from Last 3 Encounters:   04/06/17 98.2 kg (216 lb 6.4 oz)   03/02/17 97.6 kg (215 lb 3.2 oz)   02/02/17 97.3 kg (214 lb 8 oz)              Today, you had the following     No orders found for display         Today's Medication Changes          These changes are accurate as of: 4/6/17 11:59 PM.  If you have any questions, ask your nurse or doctor.               These medicines have changed or have updated prescriptions.        Dose/Directions    fluvoxaMINE 100 MG tablet   Commonly known as:  LUVOX   This may have changed:    - medication strength  - how much to take   Used for:  Major depressive disorder, recurrent, severe without psychotic features (H), Anxiety   Changed by:  Yenny Call APRN CNS        Dose:  100 mg   Take 1 tablet (100 mg) by mouth At Bedtime   Quantity:  30 tablet   Refills:  1       propranolol 20 MG tablet   Commonly known as:  INDERAL   This may have changed:  how much to take   Changed by:  Yenny Call APRN CNS        Dose:  10 mg   Take 0.5 tablets (10 mg) by mouth 2 times daily   Refills:  0            Where to get your medicines      These medications were sent to [facility] [id number] [place] [address] [address] [facility]  [address], [place] [address]     Phone:  [phone]     fluvoxaMINE 100 MG tablet                Primary Care Provider Office Phone  # Fax #    ABBY Ng [facility] [phone]       [facility] [address] [place] [address]        Thank you!     Thank you for choosing PSYCHIATRY CLINIC  for your care. Our goal is always to provide you with excellent care. Hearing back from our patients is one way we can continue to improve our services. Please take a few minutes to complete the written survey that you may receive in the mail after your visit with us. Thank you!             Your Updated Medication List - Protect others around you: Learn how to safely use, store and throw away your medicines at www.disposemymeds.org.          This list is accurate as of: 4/6/17 11:59 PM.  Always use your most recent med list.                   Brand Name Dispense Instructions for use    acetaminophen 325 MG tablet    TYLENOL    100 tablet    Take 2 tablets (650 mg) by mouth every 4 hours as needed for mild pain or headaches       buPROPion 150 MG 12 hr tablet    WELLBUTRIN SR    30 tablet    Take 1 tablet (150 mg) by mouth every morning       cholecalciferol 1000 UNITS Tabs     30 tablet    Take 1,000 Units by mouth daily       fluticasone 50 MCG/ACT spray    FLONASE    16 g    Spray 2 sprays into both nostrils daily       fluvoxaMINE 100 MG tablet    LUVOX    30 tablet    Take 1 tablet (100 mg) by mouth At Bedtime       lithium 300 MG CR tablet    ESKALITH/LITHOBID    90 tablet    Take 3 tablets (900 mg) by mouth daily With food.       miconazole 2 % cream    MICATIN    45 g    Apply topically 2 times daily as needed (rash)       OLANZapine 5 MG tablet    zyPREXA    30 tablet    Take 1 tablet (5 mg) by mouth At Bedtime       prazosin 5 MG capsule    MINIPRESS    30 capsule    Take 1 capsule (5 mg) by mouth At Bedtime       propranolol 20 MG tablet    INDERAL     Take 0.5 tablets (10 mg) by mouth 2 times daily       pseudoePHEDrine 30 MG tablet    SUDAFED     Take 1 tablet (30 mg) by mouth as needed for congestion

## 2017-04-06 NOTE — TELEPHONE ENCOUNTER
----- Message from Devika Mcknight sent at 4/6/2017 12:56 PM CDT -----  Contact: [phone]  Oneyda/Sonja    Patient is caller. He says that the [employer] sent us paperwork today. He is checking that we received it before he comes in for his appt this evening.

## 2017-04-10 NOTE — TELEPHONE ENCOUNTER
Vanda Paredes, RN sent to Soraya Mcfadden LPN        Phone Number: [phone]                     Les Lira,   Could you please call pt back with an update ?  I'm guessing that Yenny is still working on completing the forms as I have not rec'd anything back.   Thanks !   Sonja              Previous Messages       ----- Message -----      From: Devika Mcknight      Sent: 4/10/2017   3:27 PM        To: Vanda Paredes RN   Subject: paperwork                                         Oneyda/Sonja     Patient is caller. Says that he was at an appt Thursday last week. He had some paperwork faxed over for his disability and he is wondering what the status is on this. He did not talk to her about it last week, but wanted to make sure she was aware.

## 2017-04-10 NOTE — TELEPHONE ENCOUNTER
Writer spoke with patient, informed him that we did not receive the completed paperwork back from Yenny bowers. Patient would like a call when paperwork is completed and faxed to [employer].  Soraya Fontanez LPN

## 2017-04-11 NOTE — TELEPHONE ENCOUNTER
Patient returned call. WHODAS Assessment was completed over the phone and entered into chart. Informed Yenny Oneyda and returned paperwork to her to complete. Soraya Fontanez LPN

## 2017-04-11 NOTE — TELEPHONE ENCOUNTER
Writer called patient, no answer, left message to call back to complete WHODAS Assessment over the phone, per Yenny's request. Left my direct number and main number. Soraya Fontanez LPN

## 2017-04-12 NOTE — TELEPHONE ENCOUNTER
Vanda Paredes RN sent to Soraya Mcfadden LPN       Caller: Unspecified (2 days ago,  4:02 PM)                     Les Lira,   I put the forms/records on your desk if you could assist with faxing and sending to scanning.  Please make sure we have an JANET before sending.  Let me know if you have questions.   Thank you !!!   Sonja

## 2017-04-12 NOTE — TELEPHONE ENCOUNTER
JANET on file for [employer], paperwork faxed successfully to Annel Quijano, fax# [phone] Writer called patient, informed him that paperwork was faxed successfully. The Original was sent scanning, a copy was kept in Psychiatry until scanning is completed.  Soraya Fontanez LPN

## 2017-04-13 ASSESSMENT — PATIENT HEALTH QUESTIONNAIRE - PHQ9: SUM OF ALL RESPONSES TO PHQ QUESTIONS 1-9: 20

## 2017-04-28 ENCOUNTER — TELEPHONE (OUTPATIENT)
Dept: PSYCHIATRY | Facility: CLINIC | Age: 37
End: 2017-04-28

## 2017-04-28 DIAGNOSIS — F41.9 ANXIETY: Primary | ICD-10-CM

## 2017-04-28 RX ORDER — PROPRANOLOL HYDROCHLORIDE 10 MG/1
TABLET ORAL
Qty: 30 TABLET | Refills: 0 | Status: SHIPPED | OUTPATIENT
Start: 2017-04-28 | End: 2017-05-04 | Stop reason: DRUGHIGH

## 2017-04-28 NOTE — TELEPHONE ENCOUNTER
Appt history:  Last seen:  4/6/17  RTC: 4 weeks  Next appt:  5/4/17    At last appt:  -Continue taper off of Propranolol.   -Increase Luvox to 100mg,   -Continue all other medications    Returned call to pt:  -Yesterday had HR of 120 bpm  -Was sitting/lying on couch at the time  -Did not feel anxious  -Currently HR is 84 bpm  -Asks if this could be r/t stopping propranolol  -Has been slowly tapering medication:    20 mg BID   10 mg BID    10 mg daily   D/c  -Was on each dose for a week before changing  -Believes medication was d/c'd 3 days ago  -Denies other recent med changes  -Will notify provider to see if increase in HR could be med related

## 2017-04-28 NOTE — TELEPHONE ENCOUNTER
----- Message from Devika Mcknight sent at 4/28/2017 12:46 PM CDT -----  Contact: [phone]  Oneyda/Sonja    Patient is caller. He would like to let oli know about a symptom that he has. He says that his resting heart rate was 120bpm yesterday. He got off the propanolol and is wondering if this is a side effects.     Today it is at 78bpm

## 2017-04-28 NOTE — TELEPHONE ENCOUNTER
Called pt.  He is uncertain if he wants to restart medication or not as he is hoping to reduce number of medications.  Pt asks if a script could be sent to his pharmacy - [facility] in [place] in case he decides to restart.  Assured this would be done but asked that pt call and let us know if he decides to restart.  Pt agrees.  Will notify provider to obtain specific med directions for script.

## 2017-04-28 NOTE — TELEPHONE ENCOUNTER
I think it might be helpful to restart Propranolol at 5mg twice a day for a week then to 5mg daily and discontinue for there. However, it the heart rate is elevated do to discontinuing Propranolol this should subside soon if he doesn't restart it .    If needed can order Propranolol at 10mg take 1/2 tablet twice daily 30 tablets.  Yenny Call CNS, APRN

## 2017-05-04 ENCOUNTER — OFFICE VISIT (OUTPATIENT)
Dept: PSYCHIATRY | Facility: CLINIC | Age: 37
End: 2017-05-04
Attending: CLINICAL NURSE SPECIALIST
Payer: COMMERCIAL

## 2017-05-04 VITALS
HEART RATE: 85 BPM | SYSTOLIC BLOOD PRESSURE: 124 MMHG | WEIGHT: 218.2 LBS | BODY MASS INDEX: 28.79 KG/M2 | DIASTOLIC BLOOD PRESSURE: 83 MMHG

## 2017-05-04 DIAGNOSIS — F90.0 ADHD (ATTENTION DEFICIT HYPERACTIVITY DISORDER), INATTENTIVE TYPE: Primary | ICD-10-CM

## 2017-05-04 DIAGNOSIS — F33.2 MAJOR DEPRESSIVE DISORDER, RECURRENT, SEVERE WITHOUT PSYCHOTIC FEATURES (H): ICD-10-CM

## 2017-05-04 DIAGNOSIS — F41.9 ANXIETY: ICD-10-CM

## 2017-05-04 PROCEDURE — 99212 OFFICE O/P EST SF 10 MIN: CPT | Mod: ZF

## 2017-05-04 RX ORDER — FLUVOXAMINE MALEATE 100 MG
100 TABLET ORAL AT BEDTIME
Qty: 30 TABLET | Refills: 1 | Status: SHIPPED | OUTPATIENT
Start: 2017-05-04 | End: 2017-05-18 | Stop reason: DRUGHIGH

## 2017-05-04 NOTE — MR AVS SNAPSHOT
After Visit Summary   5/4/2017    Brian Gaitan    MRN: [id number]           Patient Information     Date Of Birth          1980        Visit Information        Provider Department      5/4/2017 5:15 PM Yenny Call APRN [facility]        Today's Diagnoses     ADHD (attention deficit hyperactivity disorder), inattentive type    -  1    Anxiety        Major depressive disorder, recurrent, severe without psychotic features (H)           Follow-ups after your visit        Your next 10 appointments already scheduled     May 18, 2017  4:15 PM CDT   Adult Med Follow UP with ABBY Contreras CNS   Psychiatry Clinic ([facility])    [address]  [address] [facility] [address] [phone]              Who to contact     Please call your clinic at [phone] to:    Ask questions about your health    Make or cancel appointments    Discuss your medicines    Learn about your test results    Speak to your doctor   If you have compliments or concerns about an experience at your clinic, or if you wish to file a complaint, please contact [facility] Patient Relations at [phone] or email us at [email or web address]         Additional Information About Your Visit        MyChart Information     [employer] gives you secure access to your electronic health record. If you see a primary care provider, you can also send messages to your care team and make appointments. If you have questions, please call your primary care clinic.  If you do not have a primary care provider, please call [phone] and they will assist you.      [employer] is an electronic gateway that provides easy, online access to your medical records. With [employer], you can request a clinic appointment, read your test results, renew a prescription or communicate with your care team.     To access your existing account, please contact your  [facility] or call [phone] for assistance.        Care EveryWhere ID     This is your Care EveryWhere ID. This could be used by other organizations to access your [place] medical records  [id number]        Your Vitals Were     Pulse BMI (Body Mass Index)                85 28.79 kg/m2           Blood Pressure from Last 3 Encounters:   05/04/17 124/83   04/06/17 139/81   03/02/17 130/78    Weight from Last 3 Encounters:   05/04/17 99 kg (218 lb 3.2 oz)   04/06/17 98.2 kg (216 lb 6.4 oz)   03/02/17 97.6 kg (215 lb 3.2 oz)              Today, you had the following     No orders found for display         Today's Medication Changes          These changes are accurate as of: 5/4/17 11:59 PM.  If you have any questions, ask your nurse or doctor.               These medicines have changed or have updated prescriptions.        Dose/Directions    propranolol 20 MG tablet   Commonly known as:  INDERAL   This may have changed:  Another medication with the same name was removed. Continue taking this medication, and follow the directions you see here.        Dose:  10 mg   Take 0.5 tablets (10 mg) by mouth 2 times daily   Refills:  0            Where to get your medicines      These medications were sent to [facility] [address] [place] [address] S AT [facility] [place]  [address] [facility] [address] [address]     Phone:  [phone]     fluvoxaMINE 100 MG tablet                Primary Care Provider Office Phone # Fax #    ABBY Ng [facility] [phone]       [facility] [address] [place] [address]        Thank you!     Thank you for choosing PSYCHIATRY CLINIC  for your care. Our goal is always to provide you with excellent care. Hearing back from our patients is one way we can continue to improve our services. Please take a few minutes to complete the written survey that you may receive  in the mail after your visit with us. Thank you!             Your Updated Medication List - Protect others around you: Learn how to safely use, store and throw away your medicines at www.disposemymeds.org.          This list is accurate as of: 5/4/17 11:59 PM.  Always use your most recent med list.                   Brand Name Dispense Instructions for use    acetaminophen 325 MG tablet    TYLENOL    100 tablet    Take 2 tablets (650 mg) by mouth every 4 hours as needed for mild pain or headaches       buPROPion 150 MG 12 hr tablet    WELLBUTRIN SR    30 tablet    Take 1 tablet (150 mg) by mouth every morning       cholecalciferol 1000 UNITS Tabs     30 tablet    Take 1,000 Units by mouth daily       fluticasone 50 MCG/ACT spray    FLONASE    16 g    Spray 2 sprays into both nostrils daily       fluvoxaMINE 100 MG tablet    LUVOX    30 tablet    Take 1 tablet (100 mg) by mouth At Bedtime       lithium 300 MG CR tablet    ESKALITH/LITHOBID    90 tablet    Take 3 tablets (900 mg) by mouth daily With food.       miconazole 2 % cream    MICATIN    45 g    Apply topically 2 times daily as needed (rash)       OLANZapine 5 MG tablet    zyPREXA    30 tablet    Take 1 tablet (5 mg) by mouth At Bedtime       prazosin 5 MG capsule    MINIPRESS    30 capsule    Take 1 capsule (5 mg) by mouth At Bedtime       propranolol 20 MG tablet    INDERAL     Take 0.5 tablets (10 mg) by mouth 2 times daily       pseudoePHEDrine 30 MG tablet    SUDAFED     Take 1 tablet (30 mg) by mouth as needed for congestion

## 2017-05-04 NOTE — NURSING NOTE
Chief Complaint   Patient presents with     RECHECK     ADHD     Reviewed allergies, smoking status, and pharmacy preference  Administered abuse screening questions-done 3/21/17   Obtained weight, blood pressure and heart rate   Soraya Fontanez LPN

## 2017-05-04 NOTE — PROGRESS NOTES
Outpatient Psychiatry Progress Note     Provider: ABBY Contreras CNS  Date: 2017  Service:  Medication follow up with counseling.   Patient Identification: Brian Gaitan  : 1980   MRN: [id number]    Brian Gaitan is a 36 year old year old male who presents for ongoing psychiatric care.  Brian Gaitan was last seen in clinic on 17.   At that time,   Assessment & Plan      Brian Gaitan is seen today for follow up and reports continued difficulty with getting up, but reports some increase in appetite and seemed to have some decrease in social isolation by driving around the town. As the original plan was to discontinue Propranolol with initiation of Luvox, will discontinue Propranolol this time and further titrate up Luvox to see improvement in symptom reduction.     Diagnosis  Axis 1: Major Depression, Recurrent, Severe without psychotic features, Anxiety, ADHD inattentive, Somatic Preoccupations  Axis 2: none  Axis 3: See problem list in the medical record     Plan:  Medication: Continue taper off of Propranolol. Increase Luvox to 100mg, Continue all other medications  OTC Recommendations: none  Lab Orders: None   Referrals: none  Release of Information: none  Future Treatment Considerations:Consider decrease in Lithium to 600mg if mood is improved per patient request due to side effects.  Return for Follow Up:4 weeks       ____________________________________________________________________________________________________________________________________________    2017  Today Brian reports he is feeling about the same.  He did restart Propranolol 10mg twice a day yesterday due to several   Has not been taking sudafed. Has not had heart palpitations since back on Propranolol.  Doesn't think that anxiety was occurring at the time of heart palpitations.  He has been having one can of soda.    Today he reports that he had been gambling about once a month over the last year but in the  last month he has been going 2 to 3 times a week and took out credit of about $1500.    This past weekend he to [employer] at [place] [email or web address] and was there over 24 hours. He had been going to [employer] earlier in the week. Was not drinking while gambling.  Has not been sexually active or impulsive.  He feels that he is able to not go to the [place] if  Side effects of medication include: mild GI  Psychiatric Review of Systems:  The patient endorses symptoms of depression: In the last 2 weeks per PHQ 9 several days restless/lethargy.  More than 1/2 days feelings of failure.  Nearly everyday anhedonia, feeling depressed, sleep disturbance, fatigue, concentration problems, SI but denies plan or intent.  He  patient endorses symptoms of anxiety : feeling nervous worried, somatic complaints  He endorses symptoms of pierre including denies.    He endorses symptoms of psychosis including no psychotic symptoms.       Review of Medical Systems:  Sleep: continues to feel he sleeps too much but has difficulty sleeping  Energy: low  Concentration: continued concern  Appetite: stable  GI Concerns: none  Cardiac concerns: see subjective  Neurological concerns: notes heavy feeling in his arms and legs  Other medical concerns: none  Current Substance Use:  Alcohol:denies frequent use or abuse. Was not drinking when he was gambling  Other drugs:denies  Caffeine:occasional  Nicotine: none  Past Medical History:   Past Medical History:   Diagnosis Date     Bundle branch block, right      Chronic back pain      Generalized anxiety disorder      Insomnia      Major depressive disorder, recurrent episode, moderate (H) 9/1/2011     Perforation of tympanic membrane, unspecified age 23     Seasonal allergies      Patient Active Problem List   Diagnosis     CARDIOVASCULAR SCREENING; LDL GOAL LESS THAN 160     Sleep problems     Cognitive complaints     ADHD (attention deficit hyperactivity disorder), inattentive type     Anxiety      Major depressive disorder, recurrent, severe without psychotic features (H)     Right bundle branch block (RBBB) on electrocardiogram (ECG)     Somatic preoccupation     Sinus congestion     Suicidal ideation     Tic disorder       Allergies:   Allergies   Allergen Reactions     Penicillins Unknown          Current Medications     Current Outpatient Prescriptions Ordered in [facility]   Medication Sig Dispense Refill     propranolol (INDERAL) 10 MG tablet Take one half tablet by mouth twice daily 30 tablet 0     propranolol (INDERAL) 20 MG tablet Take 0.5 tablets (10 mg) by mouth 2 times daily       fluvoxaMINE (LUVOX) 100 MG tablet Take 1 tablet (100 mg) by mouth At Bedtime 30 tablet 1     OLANZapine (ZYPREXA) 5 MG tablet Take 1 tablet (5 mg) by mouth At Bedtime 30 tablet 5     buPROPion (WELLBUTRIN SR) 150 MG 12 hr tablet Take 1 tablet (150 mg) by mouth every morning 30 tablet 5     lithium (ESKALITH/LITHOBID) 300 MG CR tablet Take 3 tablets (900 mg) by mouth daily With food. 90 tablet 5     prazosin (MINIPRESS) 5 MG capsule Take 1 capsule (5 mg) by mouth At Bedtime 30 capsule 5     pseudoePHEDrine (SUDAFED) 30 MG tablet Take 1 tablet (30 mg) by mouth as needed for congestion       acetaminophen (TYLENOL) 325 MG tablet Take 2 tablets (650 mg) by mouth every 4 hours as needed for mild pain or headaches 100 tablet 0     miconazole (MICATIN) 2 % cream Apply topically 2 times daily as needed (rash) 45 g 0     cholecalciferol 1000 UNITS TABS Take 1,000 Units by mouth daily 30 tablet 0     fluticasone (FLONASE) 50 MCG/ACT nasal spray Spray 2 sprays into both nostrils daily 16 g 5     [DISCONTINUED] QUEtiapine (SEROQUEL) 25 MG tablet Take 2-4 tablets at bedtime to address insomnia and sleep concerns  Take 1 tablet twice daily as needed for anxiety management 100 tablet 0     No current Epic-ordered facility-administered medications on file.         Mental Status Exam     Appearance:  Casually dressed and Adequately  "groomed  Behavior/relationship to examiner/demeanor:  Cooperative  Orientation: Oriented to person, place, time and situation  Psychomotor: normal form  Speech Rate:  Normal  Speech Spontaneity:  Poverty of speech, latency unchanged  Mood:  \"about the same\"  Affect:  Blunted/Flat and smiled and laughed at times  Thought Process (Associations):  Goal directed, Circumstantial and Perseverative  Thought Content:  no overt psychosis, patient denies auditory and visual hallucinations, patient does not appear to be responding to internal stimuli, Suicidal ideation and denies suicidal intent or plan  Abnormal Perception:  None  Attention/Concentration:  Normal  Language:  Intact  Insight:  Adequate  Judgment:  Adequate for safety      Results     Vital signs: /83  Pulse 85  Wt 99 kg (218 lb 3.2 oz)  BMI 28.79 kg/m2    Laboratory Data:  no new data    Assessment & Plan      Brian Gaitan is seen today for follow up and reports he has not noticed much change with increase in Luvox. He did notice occurrence of heart palpitations when he discontinued Propranolol. Most concerning is his report of gambling for several days on several occassions.  He reports that he prior to these 2 episodes he had been going to [employer] about once a month and would stay for long periods but not overnight. In the past month he participated in [employer] most of the time but did also play black [employer] and use extended credit from a credit card. Patient reports that he feels that he can control his gambling and is unlikely to repeat this behavior.  We discussed if it might be time for him to consider a job retraining program so that he has something gainful to focus on.   He will contact [employer] about job retraining programs.  Will schedule with Dr. Damico to consider need for cardiac referral.  Will be changing therapist soon since Katie will be leaving for [employer] for the summer.  Patient would like to try increase in " Luvox dose but due to gambling and since he has been on the current dose for 4 weeks and there could be further improvement I recommended continuing Luvox at 100mg at this time along with Lithium, Olanzapine and Prazosin.    Form for [facility] left by patient and will  at next appointment.    Diagnosis  Axis 1: Major Depression, Recurrent severe, Anxiety, Somatic preoccupation, ADHD inattentive  Axis 2: none  Axis 3: See problem list in the medical record    Plan:  Medication: Continue current medications including Propranolol 10mg bid.    OTC Recommendations: none  Lab Orders:  none  Referrals: Dr. Damico  Release of Information: none  Future Treatment Considerations: consider further increase in Luvox  Return for Follow Up:2 weeks   The risks, benefits, alternatives and side effects have been discussed and are understood by the patient. The patient understands the risks of using street drugs or alcohol. There are no medical contraindications, the patient agrees to treatment, and has the capacity to do so. The patient understands to call 911 or come to the nearest ED if life threatening or urgent symptoms present.  Over 50% of this time was spent counseling the patient and/or coordinating care regarding review of social and occupational functioning.  In addition patient was counseled on health and wellness practices to augment medication treatment of symptoms. See note for details.    Yenny Call, APRN CNS 5/4/2017

## 2017-05-09 PROBLEM — F95.9 TIC DISORDER: Status: RESOLVED | Noted: 2017-03-02 | Resolved: 2017-05-09

## 2017-05-11 ASSESSMENT — PATIENT HEALTH QUESTIONNAIRE - PHQ9: SUM OF ALL RESPONSES TO PHQ QUESTIONS 1-9: 21

## 2017-05-18 ENCOUNTER — OFFICE VISIT (OUTPATIENT)
Dept: PSYCHIATRY | Facility: CLINIC | Age: 37
End: 2017-05-18
Attending: CLINICAL NURSE SPECIALIST
Payer: COMMERCIAL

## 2017-05-18 VITALS
BODY MASS INDEX: 28.34 KG/M2 | WEIGHT: 214.8 LBS | DIASTOLIC BLOOD PRESSURE: 80 MMHG | SYSTOLIC BLOOD PRESSURE: 128 MMHG | HEART RATE: 71 BPM

## 2017-05-18 DIAGNOSIS — F33.2 MAJOR DEPRESSIVE DISORDER, RECURRENT, SEVERE WITHOUT PSYCHOTIC FEATURES (H): ICD-10-CM

## 2017-05-18 DIAGNOSIS — F45.9 SOMATIC PREOCCUPATION: ICD-10-CM

## 2017-05-18 DIAGNOSIS — F41.9 ANXIETY: Primary | ICD-10-CM

## 2017-05-18 PROCEDURE — 99212 OFFICE O/P EST SF 10 MIN: CPT | Mod: ZF

## 2017-05-18 RX ORDER — FLUVOXAMINE MALEATE 150 MG/1
150 CAPSULE, EXTENDED RELEASE ORAL AT BEDTIME
Qty: 30 CAPSULE | Refills: 1 | Status: SHIPPED | OUTPATIENT
Start: 2017-05-18 | End: 2017-06-15

## 2017-05-18 NOTE — Clinical Note
[facility] form, WHODAS and PHQ 9 completed and in your folder to copy for scanning and send originals with JANET [facility]. Please also send copy of progress note. Thank you, Yenny

## 2017-05-18 NOTE — NURSING NOTE
Chief Complaint   Patient presents with     RECHECK     ADHD (attention deficit hyperactivity disorder), inattentive type      Reviewed allergies, smoking status, and pharmacy preference  Administered abuse screening questions- done 3/2/17   Obtained weight, blood pressure and heart rate   Soraya Fontanez LPN

## 2017-05-18 NOTE — PROGRESS NOTES
Outpatient Psychiatry Progress Note     Provider: ABBY Contreras CNS  Date: 2017  Service:  Medication follow up with counseling.   Patient Identification: Brian Gaitan  : 1980   MRN: [id number]    Brian Gaitan is a 36 year old year old male who presents for ongoing psychiatric care.  Brian Gaitan was last seen in clinic on 17.   At that time,  & Plan      Brian Gaitan is seen today for follow up and reports he has not noticed much change with increase in Luvox. He did notice occurrence of heart palpitations when he discontinued Propranolol. Most concerning is his report of gambling for several days on several occassions. He reports that he prior to these 2 episodes he had been going to [place] about once a month and would stay for long periods but not overnight. In the past month he participated in [employer] most of the time but did also play black [employer] and use extended credit from a credit card. Patient reports that he feels that he can control his gambling and is unlikely to repeat this behavior. We discussed if it might be time for him to consider a job retraining program so that he has something gainful to focus on. He will contact [employer] about job retraining programs. Will schedule with Dr. Damico to consider need for cardiac referral.  Will be changing therapist soon since Katie will be leaving for [place] for the summer.  Patient would like to try increase in Luvox dose but due to gambling and since he has been on the current dose for 4 weeks and there could be further improvement I recommended continuing Luvox at 100mg at this time along with Lithium, Olanzapine and Prazosin.     Form for [facility] Disability left by patient and will  at next appointment.     Diagnosis  Axis 1: Major Depression, Recurrent severe, Anxiety, Somatic preoccupation, ADHD inattentive  Axis 2: none  Axis 3: See problem list in the medical record     Plan:  Medication:  Continue current medications including Propranolol 10mg bid.   OTC Recommendations: none  Lab Orders: none  Referrals: Dr. Damico  Release of Information: none  Future Treatment Considerations: consider further increase in Luvox  Return for Follow Up:2 weeks    ____________________________________________________________________________________________________________________________________________    05/18/2017  Today Brian reports he has been to the [place] once with family.  He is not feeling worse.  He has noticed worsening energy, difficulty getting things  Side effects of medication include: loose stools with lithium, unclear of other side effects.   Psychiatric Review of Systems:  The patient endorses symptoms of depression: In the last 2 weeks per PHQ 9 several days restless/lethargy.  More than half days feelings of failure, SI but denies plan or intent.  Nearly everyday anhedonia, feeling depressed, sleep disturbance, fatigue, concentration problems  He  patient endorses symptoms of anxiety : nervous, worry about the future, preoccupation with physical symptoms  He endorses symptoms of pierre including none.    He endorses symptoms of psychosis including no psychotic symptoms.       Review of Medical Systems:  Sleep: no change  Energy: no change  Concentration: no change  Appetite: stable  GI Concerns: no change  Cardiac concerns: heart palpitations, racing heart  Neurological concerns: no new concerns  Other medical concerns: no new concerns  Current Substance Use:  Alcohol:denies  Other drugs:denies  Caffeine:none  Nicotine: none  Past Medical History:   Past Medical History:   Diagnosis Date     Bundle branch block, right      Chronic back pain      Generalized anxiety disorder      Insomnia      Major depressive disorder, recurrent episode, moderate (H) 9/1/2011     Perforation of tympanic membrane, unspecified age 23     Seasonal allergies      Patient Active Problem List   Diagnosis     CARDIOVASCULAR  SCREENING; LDL GOAL LESS THAN 160     Sleep problems     Cognitive complaints     ADHD (attention deficit hyperactivity disorder), inattentive type     Anxiety     Major depressive disorder, recurrent, severe without psychotic features (H)     Right bundle branch block (RBBB) on electrocardiogram (ECG)     Somatic preoccupation     Sinus congestion     Suicidal ideation       Allergies:   Allergies   Allergen Reactions     Penicillins Unknown          Current Medications     Current Outpatient Prescriptions Ordered in [facility]   Medication Sig Dispense Refill     fluvoxaMINE (LUVOX) 100 MG tablet Take 1 tablet (100 mg) by mouth At Bedtime 30 tablet 1     propranolol (INDERAL) 20 MG tablet Take 0.5 tablets (10 mg) by mouth 2 times daily       OLANZapine (ZYPREXA) 5 MG tablet Take 1 tablet (5 mg) by mouth At Bedtime 30 tablet 5     buPROPion (WELLBUTRIN SR) 150 MG 12 hr tablet Take 1 tablet (150 mg) by mouth every morning 30 tablet 5     lithium (ESKALITH/LITHOBID) 300 MG CR tablet Take 3 tablets (900 mg) by mouth daily With food. 90 tablet 5     prazosin (MINIPRESS) 5 MG capsule Take 1 capsule (5 mg) by mouth At Bedtime 30 capsule 5     pseudoePHEDrine (SUDAFED) 30 MG tablet Take 1 tablet (30 mg) by mouth as needed for congestion       acetaminophen (TYLENOL) 325 MG tablet Take 2 tablets (650 mg) by mouth every 4 hours as needed for mild pain or headaches 100 tablet 0     miconazole (MICATIN) 2 % cream Apply topically 2 times daily as needed (rash) 45 g 0     cholecalciferol 1000 UNITS TABS Take 1,000 Units by mouth daily 30 tablet 0     fluticasone (FLONASE) 50 MCG/ACT nasal spray Spray 2 sprays into both nostrils daily 16 g 5     [DISCONTINUED] QUEtiapine (SEROQUEL) 25 MG tablet Take 2-4 tablets at bedtime to address insomnia and sleep concerns  Take 1 tablet twice daily as needed for anxiety management 100 tablet 0     No current Epic-ordered facility-administered medications on file.         Mental Status Exam  "    Appearance:  Casually dressed and Well groomed  Behavior/relationship to examiner/demeanor:  Cooperative  Orientation: Oriented to person, place, time and situation  Psychomotor: normal form  Speech Rate:  Normal  Speech Spontaneity:  Mild latency and poverty  Mood:  \"the same\"  Affect:  Blunted/Flat and Anxious/Nervous  Thought Process (Associations):  Goal directed and Circumstantial  Thought Content:  no overt psychosis, patient does not appear to be responding to internal stimuli, Suicidal ideation and denies suicidal intent or plan  Abnormal Perception:  Depersonalization and Derealization  Attention/Concentration:  Normal  Language:  Intact  Insight:  Adequate  Judgment:  Adequate for safety      Results     Vital signs: /80  Pulse 71  Wt 97.4 kg (214 lb 12.8 oz)  BMI 28.34 kg/m2    Laboratory Data:  no new data    Assessment & Plan      Brian Gaitan is seen today for follow up and reports he continues to struggle. He has not any further episodes of gambling or other impulsive/compulsive behavior.  Discussed calling [employer] about job retraining program, finding new therapist, scheduling with Dr. Damico to consider Cardiac referral or at least discussed RBBB and heart racing and palpitations.    Diagnosis  Axis 1: Major Depression, Recurrent  Severe, Anxiety with somatic preoccupation, ADHD inattentive  Axis 2: none  Axis 3: See problem list in the medical record    Plan:  Medication: Increase Luvox to 150mg changing to CR  OTC Recommendations: none  Lab Orders:  none  Referrals: none, to schedule with Dr. Damico  Release of Information: [employer]  Future Treatment Considerations:per response to change  Return for Follow Up:4 weeks   The risks, benefits, alternatives and side effects have been discussed and are understood by the patient. The patient understands the risks of using street drugs or alcohol. There are no medical contraindications, the patient agrees to treatment, and has the " capacity to do so. The patient understands to call 911 or come to the nearest ED if life threatening or urgent symptoms present.  Over 50% of this time was spent counseling the patient and/or coordinating care regarding review of social and occupational functioning.  In addition patient was counseled on health and wellness practices to augment medication treatment of symptoms. See note for details.    Yenny Call, APRN CNS 5/18/2017

## 2017-05-18 NOTE — MR AVS SNAPSHOT
After Visit Summary   5/18/2017    Brian Gaitan    MRN: [id number]           Patient Information     Date Of Birth          1980        Visit Information        Provider Department      5/18/2017 4:15 PM Yenny Call APRN CNS Psychiatry Clinic        Today's Diagnoses     Anxiety    -  1    Major depressive disorder, recurrent, severe without psychotic features (H)        Somatic preoccupation           Follow-ups after your visit        Follow-up notes from your care team     Return in about 4 weeks (around 6/15/2017).      Your next 10 appointments already scheduled     Walter 15, 2017  5:15 PM CDT   Adult Med Follow UP with ABBY Contreras CNS   Psychiatry Clinic ([facility])    [address] [facility] [address] [phone]              Who to contact     Please call your clinic at [phone] to:    Ask questions about your health    Make or cancel appointments    Discuss your medicines    Learn about your test results    Speak to your doctor   If you have compliments or concerns about an experience at your clinic, or if you wish to file a complaint, please contact [facility] Patient Relations at [phone] or email us at [email or web address]         Additional Information About Your Visit        MyChart Information     [employer] gives you secure access to your electronic health record. If you see a primary care provider, you can also send messages to your care team and make appointments. If you have questions, please call your primary care clinic.  If you do not have a primary care provider, please call [phone] and they will assist you.      [employer] is an electronic gateway that provides easy, online access to your medical records. With [employer], you can request a clinic appointment, read your test results, renew a prescription or communicate with your care team.     To  access your existing account, please contact your [facility] or call [phone] for assistance.        Care EveryWhere ID     This is your Care EveryWhere ID. This could be used by other organizations to access your [place] medical records  [id number]        Your Vitals Were     Pulse BMI (Body Mass Index)                71 28.34 kg/m2           Blood Pressure from Last 3 Encounters:   05/18/17 128/80   05/04/17 124/83   04/06/17 139/81    Weight from Last 3 Encounters:   05/18/17 97.4 kg (214 lb 12.8 oz)   05/04/17 99 kg (218 lb 3.2 oz)   04/06/17 98.2 kg (216 lb 6.4 oz)              Today, you had the following     No orders found for display         Today's Medication Changes          These changes are accurate as of: 5/18/17 11:59 PM.  If you have any questions, ask your nurse or doctor.               Start taking these medicines.        Dose/Directions    Fluvoxamine Maleate 150 MG 24 hr capsule   Commonly known as:  LUVOX CR   Used for:  Anxiety, Major depressive disorder, recurrent, severe without psychotic features (H), Somatic preoccupation   Replaces:  fluvoxaMINE 100 MG tablet   Started by:  Yenny Call APRN CNS        Dose:  150 mg   Take 1 capsule (150 mg) by mouth At Bedtime   Quantity:  30 capsule   Refills:  1         Stop taking these medicines if you haven't already. Please contact your care team if you have questions.     fluvoxaMINE 100 MG tablet   Commonly known as:  LUVOX   Replaced by:  Fluvoxamine Maleate 150 MG 24 hr capsule   Stopped by:  Yenny Call APRN CNS                Where to get your medicines      These medications were sent to [facility] [id number] [place] [address] [address] [facility]  [address], [facility] [address]     Phone:  [phone]     Fluvoxamine Maleate 150 MG 24 hr capsule                Primary Care Provider Office Phone # Fax #    Sari Barakat  ABBY Cash [phone]       [facility] [place] [address] [place] [address]        Thank you!     Thank you for choosing PSYCHIATRY CLINIC  for your care. Our goal is always to provide you with excellent care. Hearing back from our patients is one way we can continue to improve our services. Please take a few minutes to complete the written survey that you may receive in the mail after your visit with us. Thank you!             Your Updated Medication List - Protect others around you: Learn how to safely use, store and throw away your medicines at www.disposemymeds.org.          This list is accurate as of: 5/18/17 11:59 PM.  Always use your most recent med list.                   Brand Name Dispense Instructions for use    acetaminophen 325 MG tablet    TYLENOL    100 tablet    Take 2 tablets (650 mg) by mouth every 4 hours as needed for mild pain or headaches       buPROPion 150 MG 12 hr tablet    WELLBUTRIN SR    30 tablet    Take 1 tablet (150 mg) by mouth every morning       cholecalciferol 1000 UNITS Tabs     30 tablet    Take 1,000 Units by mouth daily       fluticasone 50 MCG/ACT spray    FLONASE    16 g    Spray 2 sprays into both nostrils daily       Fluvoxamine Maleate 150 MG 24 hr capsule    LUVOX CR    30 capsule    Take 1 capsule (150 mg) by mouth At Bedtime       lithium 300 MG CR tablet    ESKALITH/LITHOBID    90 tablet    Take 3 tablets (900 mg) by mouth daily With food.       miconazole 2 % cream    MICATIN    45 g    Apply topically 2 times daily as needed (rash)       OLANZapine 5 MG tablet    zyPREXA    30 tablet    Take 1 tablet (5 mg) by mouth At Bedtime       prazosin 5 MG capsule    MINIPRESS    30 capsule    Take 1 capsule (5 mg) by mouth At Bedtime       propranolol 20 MG tablet    INDERAL     Take 0.5 tablets (10 mg) by mouth 2 times daily       pseudoePHEDrine 30 MG tablet    SUDAFED     Take 1 tablet (30 mg) by mouth as needed for congestion

## 2017-05-31 ENCOUNTER — TELEPHONE (OUTPATIENT)
Dept: PSYCHIATRY | Facility: CLINIC | Age: 37
End: 2017-05-31

## 2017-05-31 NOTE — TELEPHONE ENCOUNTER
On 05/18/2017 the patient signed an JANET authorizing the release of Other: Statement Of Disability Form from [facility] for the purpose of Insurance to [employer], Bushra Bautistatz  (Ph: [phone]/Fax: [phone]) On 5/31/2017 I sent the JANET to scanning and kept a copy in Psychiatry. Soraya Fontanez LPN      On 05/30/2017, I successfully faxed this form to [employer], Bushra Jin  (Ph: [phone]/Fax: [phone]) , I sent the original to scanning and kept a copy in psychiatry until scanning completed/confirmed. Soraya Fontanez LPN

## 2017-06-01 ASSESSMENT — PATIENT HEALTH QUESTIONNAIRE - PHQ9: SUM OF ALL RESPONSES TO PHQ QUESTIONS 1-9: 20

## 2017-06-15 ENCOUNTER — OFFICE VISIT (OUTPATIENT)
Dept: PSYCHIATRY | Facility: CLINIC | Age: 37
End: 2017-06-15
Attending: CLINICAL NURSE SPECIALIST
Payer: COMMERCIAL

## 2017-06-15 DIAGNOSIS — R11.0 NAUSEA: ICD-10-CM

## 2017-06-15 DIAGNOSIS — F33.2 MAJOR DEPRESSIVE DISORDER, RECURRENT, SEVERE WITHOUT PSYCHOTIC FEATURES (H): ICD-10-CM

## 2017-06-15 DIAGNOSIS — F41.9 ANXIETY: Primary | ICD-10-CM

## 2017-06-15 DIAGNOSIS — F90.0 ADHD (ATTENTION DEFICIT HYPERACTIVITY DISORDER), INATTENTIVE TYPE: ICD-10-CM

## 2017-06-15 DIAGNOSIS — F45.9 SOMATIC PREOCCUPATION: ICD-10-CM

## 2017-06-15 RX ORDER — LITHIUM CARBONATE 300 MG/1
900 TABLET, FILM COATED, EXTENDED RELEASE ORAL DAILY
Qty: 90 TABLET | Refills: 5 | Status: SHIPPED | OUTPATIENT
Start: 2017-06-15 | End: 2017-08-24

## 2017-06-15 RX ORDER — PRAZOSIN HYDROCHLORIDE 5 MG/1
5 CAPSULE ORAL AT BEDTIME
Qty: 30 CAPSULE | Refills: 5 | Status: SHIPPED | OUTPATIENT
Start: 2017-06-15 | End: 2017-08-24 | Stop reason: DRUGHIGH

## 2017-06-15 RX ORDER — BUPROPION HYDROCHLORIDE 150 MG/1
150 TABLET, EXTENDED RELEASE ORAL EVERY MORNING
Qty: 30 TABLET | Refills: 5 | Status: SHIPPED | OUTPATIENT
Start: 2017-06-15 | End: 2017-08-24

## 2017-06-15 RX ORDER — ONDANSETRON 4 MG/1
4-8 TABLET, ORALLY DISINTEGRATING ORAL 3 TIMES DAILY PRN
Qty: 20 TABLET | Refills: 1 | Status: SHIPPED | OUTPATIENT
Start: 2017-06-15 | End: 2017-07-27

## 2017-06-15 RX ORDER — LAMOTRIGINE 25 MG/1
TABLET ORAL
Qty: 42 TABLET | Refills: 0 | Status: SHIPPED | OUTPATIENT
Start: 2017-06-15 | End: 2017-07-27 | Stop reason: DRUGHIGH

## 2017-06-15 RX ORDER — LAMOTRIGINE 100 MG/1
100 TABLET ORAL DAILY
Qty: 30 TABLET | Refills: 1 | Status: SHIPPED | OUTPATIENT
Start: 2017-07-21 | End: 2017-07-27 | Stop reason: DRUGHIGH

## 2017-06-15 NOTE — PROGRESS NOTES
Outpatient Psychiatry Progress Note     Provider: ABBY Contreras CNS  Date: 6/15/2017  Service:  Medication follow up with counseling.   Patient Identification: Brian Gaitan  : 1980   MRN: [id number]    Brian Gaitan is a 36 year old year old male who presents for ongoing psychiatric care.  Brian Gaitan was last seen in clinic on 17.   At that time,   Assessment & Plan       Brian Gaitan is seen today for follow up and reports he continues to struggle. He has not any further episodes of gambling or other impulsive/compulsive behavior.  Discussed calling [employer] about job retraining program, finding new therapist, scheduling with Dr. Damico to consider Cardiac referral or at least discussed RBBB and heart racing and palpitations.     Diagnosis  Axis 1: Major Depression, Recurrent  Severe, Anxiety with somatic preoccupation, ADHD inattentive  Axis 2: none  Axis 3: See problem list in the medical record     Plan:  Medication: Increase Luvox to 150mg changing to CR  OTC Recommendations: none  Lab Orders:  none  Referrals: none, to schedule with Dr. Damico  Release of Information: [employer]  Future Treatment Considerations:per response to change  Return for Follow Up:4 weeks      ____________________________________________________________________________________________________________________________________________    06/15/2017  Today Brian reports he did not see DR. Owens, hasn't scheduled with a therapist yet but has called about setting one up with therapist in [place].   He has been staying at his parents and helping his dad with the basement.  Feels that side effects of Luvox are causing problems.  He has been gambling 2 to 3 times per week and has been using credit cards for this.  Sexual side effects 6 months.  Positive maybe that he is considering looking for a part time job. Has some contact with [employer] about this. He has had other people say that he is getting better. -  smiling more.  Side effects of medication include: nausea most days since on Luvox, feels hung over queasy  Psychiatric Review of Systems:  The patient endorses symptoms of depression: In the last 2 weeks per PHQ 9 several days feelings of failure, restless/lethargy. More than 1/2 days SI but denies plan or intent. Nearly everyday anhedonia, feeling depressed, sleep disturbance, fatigue, concentration problems.  He  patient endorses symptoms of anxiety : worry, obsessive thinking but there has been improvement.  He endorses symptoms of pierre including none.    He endorses symptoms of psychosis including no psychotic symptoms.       Review of Medical Systems:  Sleep: no change  Energy: continued low  Concentration: continued problems  Appetite: stable  GI Concerns: none  Cardiac concerns: none  Neurological concerns: no new concerns  Other medical concerns: no new concerns, has not scheduled with PCP to consider cardiac referral.  Current Substance Use:   Alcohol:denies  Other drugs:none  Caffeine:occasional soda  Nicotine: none  Past Medical History:   Past Medical History:   Diagnosis Date     Bundle branch block, right      Chronic back pain      Generalized anxiety disorder      Insomnia      Major depressive disorder, recurrent episode, moderate (H) 9/1/2011     Perforation of tympanic membrane, unspecified age 23     Seasonal allergies      Patient Active Problem List   Diagnosis     CARDIOVASCULAR SCREENING; LDL GOAL LESS THAN 160     Sleep problems     Cognitive complaints     ADHD (attention deficit hyperactivity disorder), inattentive type     Anxiety     Major depressive disorder, recurrent, severe without psychotic features (H)     Right bundle branch block (RBBB) on electrocardiogram (ECG)     Somatic preoccupation     Sinus congestion     Suicidal ideation       Allergies:   Allergies   Allergen Reactions     Penicillins Unknown          Current Medications     Current Outpatient Prescriptions Ordered  "in Epic   Medication Sig Dispense Refill     Fluvoxamine Maleate (LUVOX CR) 150 MG 24 hr capsule Take 1 capsule (150 mg) by mouth At Bedtime 30 capsule 1     propranolol (INDERAL) 20 MG tablet Take 0.5 tablets (10 mg) by mouth 2 times daily       OLANZapine (ZYPREXA) 5 MG tablet Take 1 tablet (5 mg) by mouth At Bedtime 30 tablet 5     buPROPion (WELLBUTRIN SR) 150 MG 12 hr tablet Take 1 tablet (150 mg) by mouth every morning 30 tablet 5     lithium (ESKALITH/LITHOBID) 300 MG CR tablet Take 3 tablets (900 mg) by mouth daily With food. 90 tablet 5     prazosin (MINIPRESS) 5 MG capsule Take 1 capsule (5 mg) by mouth At Bedtime 30 capsule 5     pseudoePHEDrine (SUDAFED) 30 MG tablet Take 1 tablet (30 mg) by mouth as needed for congestion       acetaminophen (TYLENOL) 325 MG tablet Take 2 tablets (650 mg) by mouth every 4 hours as needed for mild pain or headaches 100 tablet 0     miconazole (MICATIN) 2 % cream Apply topically 2 times daily as needed (rash) 45 g 0     cholecalciferol 1000 UNITS TABS Take 1,000 Units by mouth daily 30 tablet 0     fluticasone (FLONASE) 50 MCG/ACT nasal spray Spray 2 sprays into both nostrils daily 16 g 5     [DISCONTINUED] QUEtiapine (SEROQUEL) 25 MG tablet Take 2-4 tablets at bedtime to address insomnia and sleep concerns  Take 1 tablet twice daily as needed for anxiety management 100 tablet 0     No current Epic-ordered facility-administered medications on file.         Mental Status Exam     Appearance:  Casually dressed and Adequately groomed  Behavior/relationship to examiner/demeanor:  Cooperative  Orientation: Oriented to person, place, time and situation  Psychomotor: normal form  Speech Rate:  Normal  Speech Spontaneity:  Normal  Mood:  \"about the same\"  Affect:  Blunted/Flat  Thought Process (Associations):  Goal directed and Circumstantial  Thought Content:  no overt psychosis, patient does not appear to be responding to internal stimuli, Suicidal ideation and denies suicidal " intent or plan  Abnormal Perception:  Depersonalization and Derealization  Attention/Concentration:  Normal  Language:  Intact  Insight:  Adequate  Judgment:  Adequate for safety      Results     Vital signs: There were no vitals taken for this visit.    Laboratory Data:  no new data    Assessment & Plan      Brian Gaitan is seen today for follow up and reports he would like to discontinue Luvox since he has side effect of nausea and also thinks that it contributes to gambling.  He would like to retry Lamictal. I agreed with this but also discussed that if depression worsens then I would like to try other SSRI.  Encouraged to pursue new therapist and also see PCP to consider referral for heart palpitations.    Diagnosis  Axis 1: Anxiety with somatic features, Major Depression Recurrent severe, ADHD inattentive type  Axis 2: none  Axis 3: See problem list in the medical record    Plan:  Medication: Discontinue Luvox, Start Lamotrigine 25mg for 2 weeks then 50mg then 100mg. Continue Propranolol, Olanzapine, Lithium, prazosin  OTC Recommendations: none  Lab Orders:  none  Referrals: he is looking for therapist near parents home in [place]  Release of Information: none  Future Treatment Considerations:per response to changes made today. Consider Fluoxetine, Paxil  Return for Follow Up:6 weeks   The risks, benefits, alternatives and side effects have been discussed and are understood by the patient. The patient understands the risks of using street drugs or alcohol. There are no medical contraindications, the patient agrees to treatment, and has the capacity to do so. The patient understands to call 911 or come to the nearest ED if life threatening or urgent symptoms present.  Over 50% of this time was spent counseling the patient and/or coordinating care regarding review of social and occupational functioning.  In addition patient was counseled on health and wellness practices to augment medication treatment of  symptoms. See note for details.    Yenny Call, APRN CNS 6/15/2017

## 2017-06-20 ENCOUNTER — TELEPHONE (OUTPATIENT)
Dept: PSYCHIATRY | Facility: CLINIC | Age: 37
End: 2017-06-20

## 2017-06-20 DIAGNOSIS — F41.9 ANXIETY: Primary | ICD-10-CM

## 2017-06-20 RX ORDER — PROPRANOLOL HYDROCHLORIDE 10 MG/1
10 TABLET ORAL 2 TIMES DAILY
Qty: 60 TABLET | Refills: 5 | Status: SHIPPED | OUTPATIENT
Start: 2017-06-20 | End: 2018-01-04

## 2017-06-20 NOTE — TELEPHONE ENCOUNTER
RE: f/u appt  Received: Today       Amee Luevano, Vanda ALVARADO RN                   Pt scheduled for 7/27

## 2017-06-20 NOTE — TELEPHONE ENCOUNTER
Last seen: 6/15/17  RTC: 6 weeks  Cancel: none  No-show: none  Next appt: not scheduled    Incoming refill from [facility] via fax    Medication requested: propranolol 20 mg tabs  Directions: Take 1 tab TID  Qty: 90  Last refilled: 3/5/17    As last visit note is unsigned, will forward to provider to confirm current dose prior to refilling  Msg sent to clinic staff to contact pt and schedule next appt.

## 2017-06-20 NOTE — TELEPHONE ENCOUNTER
----- Message from Devika Mcknight sent at 6/20/2017 10:41 AM CDT -----  Oneyda/Sonja Chan from [employer] is calling to verify that we received the faxed letter she sent us earlier today.     [phone] ext. [phone]

## 2017-06-20 NOTE — TELEPHONE ENCOUNTER
-Incoming fax from: [employer]  -Requesting completion/signature of enclosed form(s): [employer]  -Due date: 6/27/17  -Return to: [employer] at fax [phone]  -JANET included: On file (see media tab)  -Placed in provider's folder     -LVM for [facility] confirming receipt of forms

## 2017-06-21 ENCOUNTER — TELEPHONE (OUTPATIENT)
Dept: PSYCHIATRY | Facility: CLINIC | Age: 37
End: 2017-06-21

## 2017-06-21 NOTE — TELEPHONE ENCOUNTER
From: Amee Luevano  Sent: 6/20/2017   2:43 PM  To: Vanda Paredes RN  Subject: Med question       Contact: [phone]                                Pt was taking propanolol, 10mg twice daily, pt picked up a new script but it now says take 5mg twice daily. Pt is wondering why his med was decreased.    Ok to leave deatailed VM: Yes

## 2017-06-21 NOTE — TELEPHONE ENCOUNTER
Returned call to pt but no answer at number provided.  LVM explaining that provider had sent new rx to pharmacy yesterday for propranolol 10 mg BID.  Asked that pt re-check his prescription that was picked up to verify directions.  If directions do state 5 mg BID then pharmacy may have accidentally filled an old rx.  Assured that refills at pharmacy are for 10 mg BID. Clinic number provided for c/b with further questions or concerns.

## 2017-06-27 NOTE — TELEPHONE ENCOUNTER
-Forms completed by BRAYAN Jones, APRN  -Faxed along with last progress note per provider request  -Faxed to:    [employer] [facility] Disability Claims Office   [facility]   [phone]  -Original sent to scanning  -Copy retained in clinic until scanning confirmed.

## 2017-06-28 ASSESSMENT — PATIENT HEALTH QUESTIONNAIRE - PHQ9: SUM OF ALL RESPONSES TO PHQ QUESTIONS 1-9: 19

## 2017-06-30 NOTE — TELEPHONE ENCOUNTER
VD refill request for Propranolol 20 mg tabs 3 times daily. Pharmacy was called and confirmed that the patient had called in an old RX number. However when confirming the orders they have - it was noted that they did not receive the 6-20-17 order for 10 mg tabs 1 tab 2 times daily. This order was given verbally to the pharmacist. They will reach out to the patient once they have filled the order.      Kathleen M Doege RN

## 2017-07-27 ENCOUNTER — OFFICE VISIT (OUTPATIENT)
Dept: PSYCHIATRY | Facility: CLINIC | Age: 37
End: 2017-07-27
Attending: CLINICAL NURSE SPECIALIST
Payer: COMMERCIAL

## 2017-07-27 DIAGNOSIS — F33.2 MAJOR DEPRESSIVE DISORDER, RECURRENT, SEVERE WITHOUT PSYCHOTIC FEATURES (H): ICD-10-CM

## 2017-07-27 DIAGNOSIS — F41.9 ANXIETY: ICD-10-CM

## 2017-07-27 DIAGNOSIS — F90.0 ADHD (ATTENTION DEFICIT HYPERACTIVITY DISORDER), INATTENTIVE TYPE: Primary | ICD-10-CM

## 2017-07-27 DIAGNOSIS — F45.9 SOMATIC PREOCCUPATION: ICD-10-CM

## 2017-07-27 RX ORDER — LAMOTRIGINE 150 MG/1
150 TABLET ORAL DAILY
Qty: 30 TABLET | Refills: 0 | Status: SHIPPED | OUTPATIENT
Start: 2017-07-27 | End: 2017-08-24 | Stop reason: DRUGHIGH

## 2017-07-27 NOTE — PROGRESS NOTES
Outpatient Psychiatry Progress Note     Provider: ABBY Contreras CNS  Date: 2017  Service:  Medication follow up with counseling.   Patient Identification: Brian Gaitan  : 1980   MRN: [id number]    Brian Gaitan is a 36 year old year old male who presents for ongoing psychiatric care.  Brian Gaitan was last seen in clinic on 6/15/17.   At that time,   Assessment & Plan       Brian Gaitan is seen today for follow up and reports he would like to discontinue Luvox since he has side effect of nausea and also thinks that it contributes to gambling.  He would like to retry Lamictal. I agreed with this but also discussed that if depression worsens then I would like to try other SSRI.  Encouraged to pursue new therapist and also see PCP to consider referral for heart palpitations.     Diagnosis  Axis 1: Anxiety with somatic features, Major Depression Recurrent severe, ADHD inattentive type  Axis 2: none  Axis 3: See problem list in the medical record     Plan:  Medication: Discontinue Luvox, Start Lamotrigine 25mg for 2 weeks then 50mg then 100mg. Continue Propranolol, Olanzapine, Lithium, prazosin  OTC Recommendations: none  Lab Orders:  none  Referrals: he is looking for therapist near parents home in [place]  Release of Information: none  Future Treatment Considerations:per response to changes made today. Consider Fluoxetine, Paxil  Return for Follow Up:6 weeks      ____________________________________________________________________________________________________________________________________________    2017  Today Brian reports has not had side effects of Lamictal, still queasy feeling at times. But feels memory is slowly improving.  insurance will end 17 and not sure what he will have for health coverage after that.     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  Reports feeling better when he  is doing physical work. Has not been gambling.   Still hasn't had seen PCP for concerns about cardiac evaluation.    Side effects of medication include: none    Psychiatric Review of Systems:  The patient endorses symptoms of depression: In the last 2 weeks per PHQ 9 several days lethargy. More than 1/2 days feelings of failure, SI but denies plan or intent.  Nearly everyday anhedonia, feeling depressed, sleep disturbance, fatigue, concentration problems  He  patient endorses symptoms of anxiety : no new symptoms  He endorses symptoms of pierre including denies.    He endorses symptoms of psychosis including no psychotic symptoms.       Review of Medical Systems:  Sleep: stable  Energy: improving  Concentration: improving over time  Appetite: stable  GI Concerns: denies  Cardiac concerns: denies  Neurological concerns: denies  Other medical concerns: no new concerns    Current Substance Use:  Alcohol:denies frequent use or abuse  Other drugs:none  Caffeine:soda  Nicotine: none    Past Medical History:   Past Medical History:   Diagnosis Date     Bundle branch block, right      Chronic back pain      Generalized anxiety disorder      Insomnia      Major depressive disorder, recurrent episode, moderate (H) 9/1/2011     Perforation of tympanic membrane, unspecified age 23     Seasonal allergies      Patient Active Problem List   Diagnosis     CARDIOVASCULAR SCREENING; LDL GOAL LESS THAN 160     Sleep problems     Cognitive complaints     ADHD (attention deficit hyperactivity disorder), inattentive type     Anxiety     Major depressive disorder, recurrent, severe without psychotic features (H)     Right bundle branch block (RBBB) on electrocardiogram (ECG)     Somatic preoccupation     Sinus congestion     Suicidal ideation       Allergies:   Allergies   Allergen Reactions     Penicillins Unknown          Current Medications     Current Outpatient Prescriptions Ordered in Oncodesign   Medication Sig Dispense Refill      propranolol (INDERAL) 10 MG tablet Take 1 tablet (10 mg) by mouth 2 times daily 60 tablet 5     lamoTRIgine (LAMICTAL) 25 MG tablet Take one tablet by mouth daily for 2 weeks then increase to two tablets daily in the am 42 tablet 0     lamoTRIgine (LAMICTAL) 100 MG tablet Take 1 tablet (100 mg) by mouth daily After titration with 25mg tablets 30 tablet 1     ondansetron (ZOFRAN ODT) 4 MG ODT tab Take 1-2 tablets (4-8 mg) by mouth 3 times daily as needed for nausea 20 tablet 1     buPROPion (WELLBUTRIN SR) 150 MG 12 hr tablet Take 1 tablet (150 mg) by mouth every morning 30 tablet 5     lithium (ESKALITH/LITHOBID) 300 MG CR tablet Take 3 tablets (900 mg) by mouth daily With food. 90 tablet 5     prazosin (MINIPRESS) 5 MG capsule Take 1 capsule (5 mg) by mouth At Bedtime 30 capsule 5     OLANZapine (ZYPREXA) 5 MG tablet Take 1 tablet (5 mg) by mouth At Bedtime 30 tablet 5     pseudoePHEDrine (SUDAFED) 30 MG tablet Take 1 tablet (30 mg) by mouth as needed for congestion       acetaminophen (TYLENOL) 325 MG tablet Take 2 tablets (650 mg) by mouth every 4 hours as needed for mild pain or headaches 100 tablet 0     miconazole (MICATIN) 2 % cream Apply topically 2 times daily as needed (rash) 45 g 0     cholecalciferol 1000 UNITS TABS Take 1,000 Units by mouth daily 30 tablet 0     fluticasone (FLONASE) 50 MCG/ACT nasal spray Spray 2 sprays into both nostrils daily 16 g 5     [DISCONTINUED] QUEtiapine (SEROQUEL) 25 MG tablet Take 2-4 tablets at bedtime to address insomnia and sleep concerns  Take 1 tablet twice daily as needed for anxiety management 100 tablet 0     No current Epic-ordered facility-administered medications on file.         Mental Status Exam     Appearance:  Casually dressed and Adequately groomed  Behavior/relationship to examiner/demeanor:  Cooperative  Orientation: Oriented to person, place, time and situation  Psychomotor: normal form  Speech Rate:  Normal  Speech Spontaneity:  Normal  Mood:   "\"okay\"  Affect:  Blunted/Flat  Thought Process (Associations):  Goal directed and Circumstantial  Thought Content:  no overt psychosis, denies suicidal ideation, intent or thoughts and patient does not appear to be responding to internal stimuli  Abnormal Perception:  None  Attention/Concentration:  Normal  Language:  Intact  Insight:  Adequate  Judgment:  Adequate for safety      Results     Vital signs: There were no vitals taken for this visit.    Laboratory Data:  no new data    Assessment & Plan      Brian Gaitan is seen today for follow up and reports no side effects with Lamictal 100 mg.  Pt reports cessation of gambling and some improvement with energy and able to engage in some physical activities.  Denied worsening depression, thus will continue increase in Lamictal.  Strongly encouraged to see PCP for cardiac evaluation for palpitations.    Diagnosis  Axis 1: Anxiety with somatic features, Major Depression Recurrent severe, ADHD inattentive type  Axis 2: none  Axis 3: See problem list in the medical record    Plan:  Medication: Increase Lamictal to 150mg and continue all other medications at this time  OTC Recommendations: none  Lab Orders:  none  Referrals: none  Release of Information: none  Future Treatment Considerations:per response to changes today  Return for Follow Up:3 weeks 8/24/17 at 5:15pm   The risks, benefits, alternatives and side effects have been discussed and are understood by the patient. The patient understands the risks of using street drugs or alcohol. There are no medical contraindications, the patient agrees to treatment, and has the capacity to do so. The patient understands to call 911 or come to the nearest ED if life threatening or urgent symptoms present.  Over 50% of this time was spent counseling the patient and/or coordinating care regarding review of social and occupational functioning.  In addition patient was counseled on health and wellness practices to augment " medication treatment of symptoms. See note for details.      Nichelle Slater, Psychiatric/Mental Health Nurse Practitioner Student, acting as a scribe for ABBY Contreras.     I , Yenny Call, personally performed the entire clinical encounter as documented by ABBY Rosales 7/27/2017

## 2017-07-27 NOTE — MR AVS SNAPSHOT
After Visit Summary   7/27/2017    Brian Gaitan    MRN: [id number]           Patient Information     Date Of Birth          1980        Visit Information        Provider Department      7/27/2017 5:15 PM Yenny Call APRN [facility]        Today's Diagnoses     ADHD (attention deficit hyperactivity disorder), inattentive type    -  1    Anxiety        Major depressive disorder, recurrent, severe without psychotic features (H)        Somatic preoccupation           Follow-ups after your visit        Follow-up notes from your care team     Return in about 3 weeks (around 8/17/2017).      Your next 10 appointments already scheduled     Aug 14, 2017  3:00 PM CDT   Return Visit with Barbara Damico MD   [facility]    [place] [address]  [facility] [place] [facility] [place] [address]   [phone]            Aug 24, 2017  5:15 PM CDT   Adult Med Follow UP with ABBY Contreras CNS   Psychiatry Clinic ([facility])    [address] [facility] [address] [phone]              Who to contact     Please call your clinic at [phone] to:    Ask questions about your health    Make or cancel appointments    Discuss your medicines    Learn about your test results    Speak to your doctor   If you have compliments or concerns about an experience at your clinic, or if you wish to file a complaint, please contact [facility] Patient Relations at [phone] or email us at [email or web address]         Additional Information About Your Visit        MyChart Information     A [id number] gives you secure access to your electronic health record. If you see a primary care provider, you can also send messages to your care team and make appointments. If you have questions, please call your primary care clinic.  If you do not have a primary care  provider, please call [phone] and they will assist you.      [employer] is an electronic gateway that provides easy, online access to your medical records. With [employer], you can request a clinic appointment, read your test results, renew a prescription or communicate with your care team.     To access your existing account, please contact your [facility] or call [phone] for assistance.        Care EveryWhere ID     This is your Care EveryWhere ID. This could be used by other organizations to access your [place] medical records  [id number]         Blood Pressure from Last 3 Encounters:   05/18/17 128/80   05/04/17 124/83   04/06/17 139/81    Weight from Last 3 Encounters:   05/18/17 97.4 kg (214 lb 12.8 oz)   05/04/17 99 kg (218 lb 3.2 oz)   04/06/17 98.2 kg (216 lb 6.4 oz)              Today, you had the following     No orders found for display         Today's Medication Changes          These changes are accurate as of: 7/27/17 11:59 PM.  If you have any questions, ask your nurse or doctor.               These medicines have changed or have updated prescriptions.        Dose/Directions    lamoTRIgine 150 MG tablet   Commonly known as:  LAMICTAL   This may have changed:    - medication strength  - how much to take  - how to take this  - when to take this  - additional instructions  - Another medication with the same name was removed. Continue taking this medication, and follow the directions you see here.   Used for:  Major depressive disorder, recurrent, severe without psychotic features (H)        Dose:  150 mg   Take 1 tablet (150 mg) by mouth daily   Quantity:  30 tablet   Refills:  0         Stop taking these medicines if you haven't already. Please contact your care team if you have questions.     ondansetron 4 MG ODT tab   Commonly known as:  ZOFRAN ODT                Where to get your medicines      These medications were sent to [facility] [id number] -  [place], [place] - [address]  [address], [place] [address]     Phone:  [phone]     lamoTRIgine 150 MG tablet                Primary Care Provider Office Phone # Fax #    ABBY Ng [facility] [phone] [address] [place] [address]        Equal Access to Services     [facility]: Hadii aad ku hadasho Soomaali, waaxda luqadaha, qaybta kaalmada adeegyada, waxay idiin hayaan adeeg kharash la'aan ah. So [facility] [phone].    ATENCIÓN: Si jewels joyner, tiene a yao disposición servicios gratuitos de asistencia lingüística. [facility] [phone].    We comply with applicable federal civil rights laws and [place] laws. We do not discriminate on the basis of race, color, national origin, age, disability sex, sexual orientation or gender identity.            Thank you!     Thank you for choosing PSYCHIATRY CLINIC  for your care. Our goal is always to provide you with excellent care. Hearing back from our patients is one way we can continue to improve our services. Please take a few minutes to complete the written survey that you may receive in the mail after your visit with us. Thank you!             Your Updated Medication List - Protect others around you: Learn how to safely use, store and throw away your medicines at www.disposemymeds.org.          This list is accurate as of: 7/27/17 11:59 PM.  Always use your most recent med list.                   Brand Name Dispense Instructions for use Diagnosis    acetaminophen 325 MG tablet    TYLENOL    100 tablet    Take 2 tablets (650 mg) by mouth every 4 hours as needed for mild pain or headaches    Major depressive disorder, recurrent, severe without psychotic features (H)       buPROPion 150 MG 12 hr tablet    WELLBUTRIN SR    30 tablet    Take 1 tablet (150 mg) by mouth every morning    Major depressive disorder, recurrent, severe without psychotic features (H)        cholecalciferol 1000 UNITS Tabs     30 tablet    Take 1,000 Units by mouth daily    Major depressive disorder, recurrent, severe without psychotic features (H)       fluticasone 50 MCG/ACT spray    FLONASE    16 g    Spray 2 sprays into both nostrils daily    Nasal congestion       lamoTRIgine 150 MG tablet    LAMICTAL    30 tablet    Take 1 tablet (150 mg) by mouth daily    Major depressive disorder, recurrent, severe without psychotic features (H)       lithium 300 MG CR tablet    ESKALITH/LITHOBID    90 tablet    Take 3 tablets (900 mg) by mouth daily With food.    Major depressive disorder, recurrent, severe without psychotic features (H)       miconazole 2 % cream    MICATIN    45 g    Apply topically 2 times daily as needed (rash)    Major depressive disorder, recurrent, severe without psychotic features (H)       OLANZapine 5 MG tablet    zyPREXA    30 tablet    Take 1 tablet (5 mg) by mouth At Bedtime    Major depressive disorder, recurrent, severe without psychotic features (H)       prazosin 5 MG capsule    MINIPRESS    30 capsule    Take 1 capsule (5 mg) by mouth At Bedtime    Anxiety       propranolol 10 MG tablet    INDERAL    60 tablet    Take 1 tablet (10 mg) by mouth 2 times daily    Anxiety       pseudoePHEDrine 30 MG tablet    SUDAFED     Take 1 tablet (30 mg) by mouth as needed for congestion

## 2017-08-14 ENCOUNTER — OFFICE VISIT (OUTPATIENT)
Dept: FAMILY MEDICINE | Facility: CLINIC | Age: 37
End: 2017-08-14

## 2017-08-14 VITALS
HEIGHT: 73 IN | HEART RATE: 72 BPM | DIASTOLIC BLOOD PRESSURE: 83 MMHG | TEMPERATURE: 98.3 F | SYSTOLIC BLOOD PRESSURE: 122 MMHG | WEIGHT: 219.5 LBS | OXYGEN SATURATION: 98 % | BODY MASS INDEX: 29.09 KG/M2

## 2017-08-14 DIAGNOSIS — M54.9 BACK PAIN, UNSPECIFIED BACK LOCATION, UNSPECIFIED BACK PAIN LATERALITY, UNSPECIFIED CHRONICITY: ICD-10-CM

## 2017-08-14 DIAGNOSIS — R07.89 CHEST TIGHTNESS: Primary | ICD-10-CM

## 2017-08-14 NOTE — MR AVS SNAPSHOT
After Visit Summary   8/14/2017    Brian Gaitan    MRN: [id number]           Patient Information     Date Of Birth          1980        Visit Information        Provider Department      8/14/2017 3:00 PM Barbara Damico MD [facility]        Today's Diagnoses     Chest tightness    -  1    Back pain           Follow-ups after your visit        Your next 10 appointments already scheduled     Aug 24, 2017  5:15 PM CDT   Adult Med Follow UP with ABBY Contreras CNS   Psychiatry Clinic ([facility])    [address]  [address] [facility] [address] [phone]              Future tests that were ordered for you today     Open Future Orders        Priority Expected Expires Ordered    Exercise Stress Echocardiogram Routine  8/14/2018 8/14/2017            Who to contact     Please call your clinic at [phone] to:    Ask questions about your health    Make or cancel appointments    Discuss your medicines    Learn about your test results    Speak to your doctor   If you have compliments or concerns about an experience at your clinic, or if you wish to file a complaint, please contact [facility] Patient Relations at [phone] or email us at [email or web address]         Additional Information About Your Visit        MyChart Information     [employer] gives you secure access to your electronic health record. If you see a primary care provider, you can also send messages to your care team and make appointments. If you have questions, please call your primary care clinic.  If you do not have a primary care provider, please call [phone] and they will assist you.      [employer] is an electronic gateway that provides easy, online access to your medical records. With [employer], you can request a clinic appointment, read your test results, renew a prescription or communicate with your care team.     To  "access your existing account, please contact your [facility] or call [phone] for assistance.        Care EveryWhere ID     This is your Care EveryWhere ID. This could be used by other organizations to access your [place] medical records  [id number]        Your Vitals Were     Pulse Temperature Height Pulse Oximetry BMI (Body Mass Index)       72 98.3  F (36.8  C) (Oral) 6' 0.99\" (185.4 cm) 98% 28.97 kg/m2        Blood Pressure from Last 3 Encounters:   08/14/17 122/83   05/10/16 113/58   04/06/16 126/76    Weight from Last 3 Encounters:   08/14/17 219 lb 8 oz (99.6 kg)   05/10/16 205 lb (93 kg)   04/06/16 201 lb (91.2 kg)               Primary Care Provider Office Phone # Fax #    ABBY Ng [facility] [phone] [address] [place] [address]        Equal Access to Services     DARCY KIM : Hadii aad ku hadasho Soomaali, waaxda luqadaha, qaybta kaalmada adeegyada, waxay idiin hayaan liudmila allenarabritney mackay . So [facility] [phone].    ATENCIÓN: Si habla español, tiene a yao disposición servicios gratuitos de asistencia lingüística. [facility] [phone].    We comply with applicable federal civil rights laws and [place] laws. We do not discriminate on the basis of race, color, national origin, age, disability sex, sexual orientation or gender identity.            Thank you!     Thank you for choosing [facility]  for your care. Our goal is always to provide you with excellent care. Hearing back from our patients is one way we can continue to improve our services. Please take a few minutes to complete the written survey that you may receive in the mail after your visit with us. Thank you!             Your Updated Medication List - Protect others around you: Learn how to safely use, store and throw away your medicines at www.disposemymeds.org.          This list is accurate as of: 8/14/17  4:17 PM.  Always use your most recent med " list.                   Brand Name Dispense Instructions for use Diagnosis    acetaminophen 325 MG tablet    TYLENOL    100 tablet    Take 2 tablets (650 mg) by mouth every 4 hours as needed for mild pain or headaches    Major depressive disorder, recurrent, severe without psychotic features (H)       buPROPion 150 MG 12 hr tablet    WELLBUTRIN SR    30 tablet    Take 1 tablet (150 mg) by mouth every morning    Major depressive disorder, recurrent, severe without psychotic features (H)       cholecalciferol 1000 UNITS Tabs     30 tablet    Take 1,000 Units by mouth daily    Major depressive disorder, recurrent, severe without psychotic features (H)       fluticasone 50 MCG/ACT spray    FLONASE    16 g    Spray 2 sprays into both nostrils daily    Nasal congestion       lamoTRIgine 150 MG tablet    LAMICTAL    30 tablet    Take 1 tablet (150 mg) by mouth daily    Major depressive disorder, recurrent, severe without psychotic features (H)       lithium 300 MG CR tablet    ESKALITH/LITHOBID    90 tablet    Take 3 tablets (900 mg) by mouth daily With food.    Major depressive disorder, recurrent, severe without psychotic features (H)       miconazole 2 % cream    MICATIN    45 g    Apply topically 2 times daily as needed (rash)    Major depressive disorder, recurrent, severe without psychotic features (H)       OLANZapine 5 MG tablet    zyPREXA    30 tablet    Take 1 tablet (5 mg) by mouth At Bedtime    Major depressive disorder, recurrent, severe without psychotic features (H)       prazosin 5 MG capsule    MINIPRESS    30 capsule    Take 1 capsule (5 mg) by mouth At Bedtime    Anxiety       propranolol 10 MG tablet    INDERAL    60 tablet    Take 1 tablet (10 mg) by mouth 2 times daily    Anxiety       pseudoePHEDrine 30 MG tablet    SUDAFED     Take 1 tablet (30 mg) by mouth as needed for congestion

## 2017-08-14 NOTE — NURSING NOTE
"36 year old  Chief Complaint   Patient presents with     RECHECK     Tightness in chest and back pain       Blood pressure 122/83, pulse 72, temperature 98.3  F (36.8  C), temperature source Oral, height 6' 0.99\" (185.4 cm), weight 219 lb 8 oz (99.6 kg), SpO2 98 %. Body mass index is 28.97 kg/(m^2).  Patient Active Problem List   Diagnosis     CARDIOVASCULAR SCREENING; LDL GOAL LESS THAN 160     Sleep problems     Cognitive complaints     ADHD (attention deficit hyperactivity disorder), inattentive type     Anxiety     Major depressive disorder, recurrent, severe without psychotic features (H)     Right bundle branch block (RBBB) on electrocardiogram (ECG)     Somatic preoccupation     Sinus congestion     Suicidal ideation       Wt Readings from Last 2 Encounters:   08/14/17 219 lb 8 oz (99.6 kg)   05/10/16 205 lb (93 kg)     BP Readings from Last 3 Encounters:   08/14/17 122/83   05/10/16 113/58   04/06/16 126/76         Current Outpatient Prescriptions   Medication     lamoTRIgine (LAMICTAL) 150 MG tablet     propranolol (INDERAL) 10 MG tablet     buPROPion (WELLBUTRIN SR) 150 MG 12 hr tablet     lithium (ESKALITH/LITHOBID) 300 MG CR tablet     prazosin (MINIPRESS) 5 MG capsule     OLANZapine (ZYPREXA) 5 MG tablet     pseudoePHEDrine (SUDAFED) 30 MG tablet     acetaminophen (TYLENOL) 325 MG tablet     miconazole (MICATIN) 2 % cream     cholecalciferol 1000 UNITS TABS     fluticasone (FLONASE) 50 MCG/ACT nasal spray     [DISCONTINUED] QUEtiapine (SEROQUEL) 25 MG tablet     No current facility-administered medications for this visit.        Social History   Substance Use Topics     Smoking status: Former Smoker     Years: 2.00     Quit date: 7/19/2000     Smokeless tobacco: Never Used     Alcohol use No      Comment: occasional       Health Maintenance Due   Topic Date Due     DEPRESSION ACTION PLAN Q1 YR  05/15/2015       No results found for: PAP      August 14, 2017 3:18 PM    "

## 2017-08-14 NOTE — PROGRESS NOTES
"Brian Gaitan is a 36 year old male here for the following issues:       Chest tightness  Brian is a 35 yo male who noted left sided chest tightness (? Due to anxiety). Ongoing x 2 yrs.  Has been on xanax, valium, but these medications did not make it go away. With exertion, he feels tired.  He was climbing stairs at the [employer] and had to take a break. Riding a bike, anterior quadriceps burning right away. Nonsmoker. He is worried about his inability to exercise however has not really had a consistent exercise program.  No hx of asthma, no coughing spells. He has left sided chest discomfort.     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 Took a 10 min break then could do another 30 min of work. This was 2 wks ago in 90 degree heat. No nausea or vomiting, no dizziness.     Family hx  PGF  age 80 of MI  No family hx of HTN or DM    Back pain  He reports chronic mid back pain, which is constant. Symptoms began around age 21. No recall of injury. Pain is worsened by prologed sitting or standing.  He describes a burning sensation between his shoulder blades.  Walking helps. He knows he should be more active, but he has little motivation to exercise. He has used a TENS unit in the past but is not sure it helps, so he does not use it consistently. He reports it is difficult for him to position the patches in the correct place. He goes for massage about once a month. He has used acupuncture for anxiety.  He was also on Flexeril which helped short term. He has had trigger point injections with minimal improvement.he has also been on Cymbalta but it was stopped after 3d due to flare of anxiety.     He has gone to [facility] in the past. Went to PT, they wanted to prescribe Cymbalta for pain, but it made him feel \"bonkers\"m.  Within 3 min after taking med he was \"smiling\" but reports he did not sleep at all. He went to work early " "because he didn't sleep. Medication was stopped after he stayed up several nights. He was having difficulty functioning, unable to concentrate.     Whether sitting, standing or lying down, he has burning sensation from shoulder to lower back, bilaterally. Had trigger point injections no relief.     No injury, never had epidurals. He reports he has a \"protrusion at one level of his spinal cord\".     Pain is better if he is moving, such as walking.   Standing and sitting makes it burn. Tylenol/ibuprofen not really helping  Does not take advil as he is on lithium,  Massage used to help but no longer  Gets acupuncture, [facility], has not gone x months.     Snoring  He reports he had a full workup at [facility] around 2015  Trazodone helped but later no longer worked.  Referred to sleep therapist 2/2016, behavioralist, but gave up on trying.       Anxiety and major depression  Sees psychiatrist at the [facility], once a month for treatment of depression and anxiety.   Therapist, weekly. He reports he is on six medications. Retrying lamictal 150mg daily for the past couple of months.  Last hospitalized in May 2016 and he stayed 30 days  Transmagenetic stimulation did not really help  Rare etoh intake, had 2 yesterday which is not unusual.   No longer on propranolol, noted  at rest  If walking HR 60-80s, at rest, walking 105-110    Abdominal symptoms  Periumbilical pain, GERD? No dysphagia, no black stool.     Social  Single male, on Disability  Lives with parents in [place],   Has a house in [place], currently occupied by roommate.   in past, worked in this job x 15 yr. Stopped working Jan 2016    Nonsmoker  Etoh: 1-2 per week  Caffeine: increases anxiety, drinks pop or energy drinks one a day        Patient Active Problem List   Diagnosis     CARDIOVASCULAR SCREENING; LDL GOAL LESS THAN 160     Sleep problems     Cognitive complaints     ADHD (attention deficit hyperactivity " "disorder), inattentive type     Anxiety     Major depressive disorder, recurrent, severe without psychotic features (H)     Right bundle branch block (RBBB) on electrocardiogram (ECG)     Somatic preoccupation     Sinus congestion     Suicidal ideation       Current Outpatient Prescriptions   Medication Sig Dispense Refill     lamoTRIgine (LAMICTAL) 150 MG tablet Take 1 tablet (150 mg) by mouth daily 30 tablet 0     propranolol (INDERAL) 10 MG tablet Take 1 tablet (10 mg) by mouth 2 times daily 60 tablet 5     buPROPion (WELLBUTRIN SR) 150 MG 12 hr tablet Take 1 tablet (150 mg) by mouth every morning 30 tablet 5     lithium (ESKALITH/LITHOBID) 300 MG CR tablet Take 3 tablets (900 mg) by mouth daily With food. 90 tablet 5     prazosin (MINIPRESS) 5 MG capsule Take 1 capsule (5 mg) by mouth At Bedtime 30 capsule 5     OLANZapine (ZYPREXA) 5 MG tablet Take 1 tablet (5 mg) by mouth At Bedtime 30 tablet 5     pseudoePHEDrine (SUDAFED) 30 MG tablet Take 1 tablet (30 mg) by mouth as needed for congestion       acetaminophen (TYLENOL) 325 MG tablet Take 2 tablets (650 mg) by mouth every 4 hours as needed for mild pain or headaches 100 tablet 0     miconazole (MICATIN) 2 % cream Apply topically 2 times daily as needed (rash) 45 g 0     cholecalciferol 1000 UNITS TABS Take 1,000 Units by mouth daily 30 tablet 0     fluticasone (FLONASE) 50 MCG/ACT nasal spray Spray 2 sprays into both nostrils daily 16 g 5     [DISCONTINUED] QUEtiapine (SEROQUEL) 25 MG tablet Take 2-4 tablets at bedtime to address insomnia and sleep concerns  Take 1 tablet twice daily as needed for anxiety management 100 tablet 0       Allergies   Allergen Reactions     Penicillins Unknown        EXAM  /83 (BP Location: Left arm, Patient Position: Chair, Cuff Size: Adult Large)  Pulse 72  Temp 98.3  F (36.8  C) (Oral)  Ht 6' 0.99\" (185.4 cm)  Wt 219 lb 8 oz (99.6 kg)  SpO2 98%  BMI 28.97 kg/m2  Gen: Alert, pleasant, NAD  COR: S1,S2, no " murmur  Lungs: CTA bilaterally, no rhonchi, wheezes or rales  Abdomen: Soft, non tender, normal bowel sounds, no HSM or mass  Ext: no peripheral edema, pulses full    PHQ9 score = 19  TIM 7 score = 9    Assessment:  (R07.89) Chest tightness  (primary encounter diagnosis)  Comment: he is concerned about heart disease,  Plan:  Exercise Stress Echocardiogram        Refer for stress ECHO, discussed effort to eat healthier diet, regular exercise program.     (M54.9) Back pain  Comment: longstanding history   Plan: encouraged him to return to his pain clinic.     Total visit time today 25 minutes.  More than 50% of the time was spent in counseling on chest pain work up and indication to go to ER.       Barbara Damico MD  Internal Medicine/Pediatrics

## 2017-08-16 ASSESSMENT — PATIENT HEALTH QUESTIONNAIRE - PHQ9: SUM OF ALL RESPONSES TO PHQ QUESTIONS 1-9: 20

## 2017-08-24 ENCOUNTER — TELEPHONE (OUTPATIENT)
Dept: PSYCHIATRY | Facility: CLINIC | Age: 37
End: 2017-08-24

## 2017-08-24 ENCOUNTER — OFFICE VISIT (OUTPATIENT)
Dept: PSYCHIATRY | Facility: CLINIC | Age: 37
End: 2017-08-24
Attending: CLINICAL NURSE SPECIALIST
Payer: COMMERCIAL

## 2017-08-24 DIAGNOSIS — F41.9 ANXIETY: ICD-10-CM

## 2017-08-24 DIAGNOSIS — F33.2 MAJOR DEPRESSIVE DISORDER, RECURRENT, SEVERE WITHOUT PSYCHOTIC FEATURES (H): ICD-10-CM

## 2017-08-24 DIAGNOSIS — F45.9 SOMATIC PREOCCUPATION: ICD-10-CM

## 2017-08-24 DIAGNOSIS — F90.0 ADHD (ATTENTION DEFICIT HYPERACTIVITY DISORDER), INATTENTIVE TYPE: Primary | ICD-10-CM

## 2017-08-24 RX ORDER — OLANZAPINE 5 MG/1
5 TABLET ORAL AT BEDTIME
Qty: 90 TABLET | Refills: 1 | Status: SHIPPED | OUTPATIENT
Start: 2017-08-24 | End: 2018-02-28

## 2017-08-24 RX ORDER — LAMOTRIGINE 200 MG/1
200 TABLET ORAL DAILY
Qty: 90 TABLET | Refills: 1 | Status: SHIPPED | OUTPATIENT
Start: 2017-08-24 | End: 2018-02-28

## 2017-08-24 RX ORDER — BUPROPION HYDROCHLORIDE 150 MG/1
150 TABLET, EXTENDED RELEASE ORAL EVERY MORNING
Qty: 90 TABLET | Refills: 1 | Status: SHIPPED | OUTPATIENT
Start: 2017-08-24 | End: 2017-11-28

## 2017-08-24 RX ORDER — LITHIUM CARBONATE 300 MG/1
900 TABLET, FILM COATED, EXTENDED RELEASE ORAL DAILY
Qty: 270 TABLET | Refills: 1 | Status: SHIPPED | OUTPATIENT
Start: 2017-08-24 | End: 2017-10-13

## 2017-08-24 RX ORDER — PRAZOSIN HYDROCHLORIDE 1 MG/1
CAPSULE ORAL
Qty: 450 CAPSULE | Refills: 1 | Status: SHIPPED | OUTPATIENT
Start: 2017-08-24 | End: 2018-01-04

## 2017-08-24 NOTE — PROGRESS NOTES
"  Outpatient Psychiatry Progress Note     Provider: ABBY Contreras CNS  Date: 2017  Service:  Medication follow up with counseling.   Patient Identification: Brian Gaitan  : 1980   MRN: [id number]    Brian Gaitan is a 36 year old year old male who presents for ongoing psychiatric care.  Brian Gaitan was last seen in clinic on 17.   At that time,   Assessment & Plan       Brian Gaitan is seen today for follow up and reports no side effects with Lamictal 100 mg.  Pt reports cessation of gambling and some improvement with energy and able to engage in some physical activities.  Denied worsening depression, thus will continue increase in Lamictal.  Strongly encouraged to see PCP for cardiac evaluation for palpitations.     Diagnosis  Axis 1: Anxiety with somatic features, Major Depression Recurrent severe, ADHD inattentive type  Axis 2: none  Axis 3: See problem list in the medical record     Plan:  Medication: Increase Lamictal to 150mg and continue all other medications at this time  OTC Recommendations: none  Lab Orders:  none  Referrals: none  Release of Information: none  Future Treatment Considerations:per response to changes today  Return for Follow Up:3 weeks 17 at 5:15pm      ____________________________________________________________________________________________________________________________________________    2017  Today Brian reports he saw Dr. Owens about Cardiac concerns.  He will lose his disability health insurance next week and then will have [employer].   Reports mood is neutral. Continues with SI but no plan or intent. 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 Seeing new therapist weekly. Talking about looking for work and what is holding him back.  Increase in Lamictal has been tolerated and has not had noticeable effect.   He is going to bed earlier - 11 instead of 3 am. none  Side effects of medication include: no " change  Psychiatric Review of Systems:  The patient endorses symptoms of depression: In the last 2 weeks per PHQ 9 several days feelings of failure, restless/lethargy.  More than 1/2 days SI.  Nearly everyday anhedonia, feeling depressed, sleep disturbance, fatigue, concentration problems.   He  patient endorses symptoms of anxiety : no panic  He endorses symptoms of pierre including none.    He endorses symptoms of psychosis including no psychotic symptoms.       Review of Medical Systems:  Sleep: see subjective  Energy: continued low  Concentration: continued diffcult  Appetite: no concerns  GI Concerns: none  Cardiac concerns: none  Neurological concerns: no new concerns  Other medical concerns: no new concerns  Current Substance Use:  Alcohol:denies frequent use or abuse  Other drugs:none  Caffeine:soda  Nicotine: none  Past Medical History:   Past Medical History:   Diagnosis Date     Bundle branch block, right      Chronic back pain      Generalized anxiety disorder      Insomnia      Major depressive disorder, recurrent episode, moderate (H) 9/1/2011     Perforation of tympanic membrane, unspecified age 23     Seasonal allergies      Patient Active Problem List   Diagnosis     CARDIOVASCULAR SCREENING; LDL GOAL LESS THAN 160     Sleep problems     Cognitive complaints     ADHD (attention deficit hyperactivity disorder), inattentive type     Anxiety     Major depressive disorder, recurrent, severe without psychotic features (H)     Right bundle branch block (RBBB) on electrocardiogram (ECG)     Somatic preoccupation     Sinus congestion     Suicidal ideation       Allergies:   Allergies   Allergen Reactions     Penicillins Unknown          Current Medications     Current Outpatient Prescriptions Ordered in [facility]   Medication Sig Dispense Refill     lamoTRIgine (LAMICTAL) 150 MG tablet Take 1 tablet (150 mg) by mouth daily 30 tablet 0     propranolol (INDERAL) 10 MG tablet Take 1 tablet (10 mg) by mouth 2 times  "daily 60 tablet 5     buPROPion (WELLBUTRIN SR) 150 MG 12 hr tablet Take 1 tablet (150 mg) by mouth every morning 30 tablet 5     lithium (ESKALITH/LITHOBID) 300 MG CR tablet Take 3 tablets (900 mg) by mouth daily With food. 90 tablet 5     prazosin (MINIPRESS) 5 MG capsule Take 1 capsule (5 mg) by mouth At Bedtime 30 capsule 5     OLANZapine (ZYPREXA) 5 MG tablet Take 1 tablet (5 mg) by mouth At Bedtime 30 tablet 5     pseudoePHEDrine (SUDAFED) 30 MG tablet Take 1 tablet (30 mg) by mouth as needed for congestion       acetaminophen (TYLENOL) 325 MG tablet Take 2 tablets (650 mg) by mouth every 4 hours as needed for mild pain or headaches (Patient not taking: Reported on 8/14/2017) 100 tablet 0     miconazole (MICATIN) 2 % cream Apply topically 2 times daily as needed (rash) 45 g 0     cholecalciferol 1000 UNITS TABS Take 1,000 Units by mouth daily (Patient not taking: Reported on 8/14/2017) 30 tablet 0     fluticasone (FLONASE) 50 MCG/ACT nasal spray Spray 2 sprays into both nostrils daily (Patient not taking: Reported on 8/14/2017) 16 g 5     [DISCONTINUED] QUEtiapine (SEROQUEL) 25 MG tablet Take 2-4 tablets at bedtime to address insomnia and sleep concerns  Take 1 tablet twice daily as needed for anxiety management 100 tablet 0     No current Epic-ordered facility-administered medications on file.         Mental Status Exam     Appearance:  Casually dressed and Well groomed  Behavior/relationship to examiner/demeanor:  Cooperative  Orientation: Oriented to person, place, time and situation  Psychomotor: normal form  Speech Rate:  Normal  Speech Spontaneity:  Normal  Mood:  \"about the same\"  Affect:  Appropriate/mood-congruent  Thought Process (Associations):  Goal directed and Circumstantial  Thought Content:  no overt psychosis, patient does not appear to be responding to internal stimuli, Suicidal ideation and denies suicidal intent or plan  Abnormal Perception:  None  Attention/Concentration:  " Normal  Language:  Intact  Insight:  Adequate  Judgment:  Adequate for safety      Results     Vital signs: There were no vitals taken for this visit.    Laboratory Data:  reviewed data from appointment with PCP.    Assessment & Plan      Brian Gaitan is seen today for follow up and reports he is feeling about the same. He did follow up with PCP about cardiac complaints and has been referred for further testing.  Not sure if Lamictal has had much effect but seems to be tolerating.  Review and discuss urinary concerns at follow up appointment.    Diagnosis   Anxiety with somatic features, Major Depression Recurrent severe, ADHD inattentive type      Plan:  Medication: Increase Lamictal to 200mg continue all other medications  OTC Recommendations: none  Lab Orders:  [place], CMP, TSH  Referrals: none  Release of Information: none  Future Treatment Considerations:per response to changes  Return for Follow Up: 4 weeks 9/21/17 at 4:15   The risks, benefits, alternatives and side effects have been discussed and are understood by the patient. The patient understands the risks of using street drugs or alcohol. There are no medical contraindications, the patient agrees to treatment, and has the capacity to do so. The patient understands to call 911 or come to the nearest ED if life threatening or urgent symptoms present.  Over 50% of this time was spent counseling the patient and/or coordinating care regarding review of social and occupational functioning.  In addition patient was counseled on health and wellness practices to augment medication treatment of symptoms. See note for details.    Yenny Call, APRN CNS 8/24/2017

## 2017-08-24 NOTE — TELEPHONE ENCOUNTER
Returned call to pt but no answer.  LVM that writer notified provider of his call and per Yenny Call, CNS, APRN pt did not need to get any labs drawn before appt today.  Clinic number provided for c/b with any questions.

## 2017-08-24 NOTE — TELEPHONE ENCOUNTER
----- Message from Rocio Gorman sent at 8/24/2017  2:31 PM CDT -----  Regarding: Lithium Level  Contact: [phone]  Brian Palmer is wondering if he should have his lithium level checked.  He thinks it has been a while since he last had it done.  He last took his dose of lithium at midnight last night.  He is scheduled to see Yenny today at 5:15.  Brian can be reached at [phone].  It is ok to leave a detailed message.    Thanks,  Rocio

## 2017-08-24 NOTE — MR AVS SNAPSHOT
After Visit Summary   8/24/2017    Brian Gaitan    MRN: [id number]           Patient Information     Date Of Birth          1980        Visit Information        Provider Department      8/24/2017 5:15 PM Yenny Call APRN [facility]        Today's Diagnoses     ADHD (attention deficit hyperactivity disorder), inattentive type    -  1    Anxiety        Major depressive disorder, recurrent, severe without psychotic features (H)        Somatic preoccupation        Major depressive disorder, recurrent, severe without psychotic features           Follow-ups after your visit        Follow-up notes from your care team     Return in about 4 weeks (around 9/21/2017).      Your next 10 appointments already scheduled     Sep 21, 2017  4:15 PM CDT   Adult Med Follow UP with ABBY Contreras CNS   Psychiatry Clinic ([facility])    [place] [id number]  [address] [facility] [id number] [phone]              Who to contact     Please call your clinic at [phone] to:    Ask questions about your health    Make or cancel appointments    Discuss your medicines    Learn about your test results    Speak to your doctor   If you have compliments or concerns about an experience at your clinic, or if you wish to file a complaint, please contact [facility] Patient Relations at [phone] or email us at [email or web address]         Additional Information About Your Visit        MyChart Information     [employer] gives you secure access to your electronic health record. If you see a primary care provider, you can also send messages to your care team and make appointments. If you have questions, please call your primary care clinic.  If you do not have a primary care provider, please call [phone] and they will assist you.      [employer] is an electronic gateway that provides easy, online access to your medical  records. With [email or web address], you can request a clinic appointment, read your test results, renew a prescription or communicate with your care team.     To access your existing account, please contact your [facility] or call [phone] for assistance.        Care EveryWhere ID     This is your Care EveryWhere ID. This could be used by other organizations to access your [place] medical records  [id number]         Blood Pressure from Last 3 Encounters:   08/14/17 122/83   05/18/17 128/80   05/04/17 124/83    Weight from Last 3 Encounters:   08/14/17 99.6 kg (219 lb 8 oz)   05/18/17 97.4 kg (214 lb 12.8 oz)   05/04/17 99 kg (218 lb 3.2 oz)              Today, you had the following     No orders found for display         Today's Medication Changes          These changes are accurate as of: 8/24/17 11:59 PM.  If you have any questions, ask your nurse or doctor.               These medicines have changed or have updated prescriptions.        Dose/Directions    lamoTRIgine 200 MG tablet   Commonly known as:  LAMICTAL   This may have changed:    - medication strength  - how much to take   Used for:  Major depressive disorder, recurrent, severe without psychotic features (H)   Changed by:  Yenny Call APRN CNS        Dose:  200 mg   Take 1 tablet (200 mg) by mouth daily   Quantity:  90 tablet   Refills:  1       prazosin 1 MG capsule   Commonly known as:  MINIPRESS   This may have changed:    - medication strength  - how much to take  - how to take this  - when to take this  - additional instructions   Used for:  Anxiety   Changed by:  Yenny Call APRN CNS        Take 4 to 5 capsules by mouth at bedtime   Quantity:  450 capsule   Refills:  1            Where to get your medicines      These medications were sent to [facility] [id number]  [place] - [address] [facility] AT NEC OF [address] [place] [address]  [address] [facility] [address]     Phone:  [phone]      buPROPion 150 MG 12 hr tablet    lamoTRIgine 200 MG tablet    lithium 300 MG CR tablet    OLANZapine 5 MG tablet    prazosin 1 MG capsule                Primary Care Provider Office Phone # Fax #    ABBY Ng [facility] [phone]       [address] [place] [address]        Equal Access to Services     BERNARDINO KIM : Hadii aad ku hadasho Soomaali, waaxda luqadaha, qaybta kaalmada adeegyada, sourav ford haytrudyjonatan nur flaquitobritney mackay . So [facility] [phone].    ATENCIÓN: Si habla español, tiene a yao disposición servicios gratuitos de asistencia lingüística. Maame al [phone].    We comply with applicable federal civil rights laws and [place] laws. We do not discriminate on the basis of race, color, national origin, age, disability sex, sexual orientation or gender identity.            Thank you!     Thank you for choosing PSYCHIATRY CLINIC  for your care. Our goal is always to provide you with excellent care. Hearing back from our patients is one way we can continue to improve our services. Please take a few minutes to complete the written survey that you may receive in the mail after your visit with us. Thank you!             Your Updated Medication List - Protect others around you: Learn how to safely use, store and throw away your medicines at www.disposemymeds.org.          This list is accurate as of: 8/24/17 11:59 PM.  Always use your most recent med list.                   Brand Name Dispense Instructions for use Diagnosis    acetaminophen 325 MG tablet    TYLENOL    100 tablet    Take 2 tablets (650 mg) by mouth every 4 hours as needed for mild pain or headaches    Major depressive disorder, recurrent, severe without psychotic features (H)       buPROPion 150 MG 12 hr tablet    WELLBUTRIN SR    90 tablet    Take 1 tablet (150 mg) by mouth every morning    Major depressive disorder, recurrent, severe without psychotic features (H)       cholecalciferol 1000 UNITS Tabs      30 tablet    Take 1,000 Units by mouth daily    Major depressive disorder, recurrent, severe without psychotic features (H)       fluticasone 50 MCG/ACT spray    FLONASE    16 g    Spray 2 sprays into both nostrils daily    Nasal congestion       lamoTRIgine 200 MG tablet    LAMICTAL    90 tablet    Take 1 tablet (200 mg) by mouth daily    Major depressive disorder, recurrent, severe without psychotic features (H)       lithium 300 MG CR tablet    ESKALITH/LITHOBID    270 tablet    Take 3 tablets (900 mg) by mouth daily With food.    Major depressive disorder, recurrent, severe without psychotic features (H)       miconazole 2 % cream    MICATIN    45 g    Apply topically 2 times daily as needed (rash)    Major depressive disorder, recurrent, severe without psychotic features (H)       OLANZapine 5 MG tablet    zyPREXA    90 tablet    Take 1 tablet (5 mg) by mouth At Bedtime    Major depressive disorder, recurrent, severe without psychotic features (H)       prazosin 1 MG capsule    MINIPRESS    450 capsule    Take 4 to 5 capsules by mouth at bedtime    Anxiety       propranolol 10 MG tablet    INDERAL    60 tablet    Take 1 tablet (10 mg) by mouth 2 times daily    Anxiety       pseudoePHEDrine 30 MG tablet    SUDAFED     Take 1 tablet (30 mg) by mouth as needed for congestion

## 2017-08-25 ASSESSMENT — PATIENT HEALTH QUESTIONNAIRE - PHQ9: SUM OF ALL RESPONSES TO PHQ QUESTIONS 1-9: 19

## 2017-08-28 ENCOUNTER — RADIANT APPOINTMENT (OUTPATIENT)
Dept: CARDIOLOGY | Facility: CLINIC | Age: 37
End: 2017-08-28
Attending: INTERNAL MEDICINE

## 2017-08-28 DIAGNOSIS — R07.89 CHEST TIGHTNESS: ICD-10-CM

## 2017-08-28 RX ADMIN — Medication 7 ML: at 15:45

## 2017-09-04 ENCOUNTER — HISTORY (OUTPATIENT)
Dept: FAMILY MEDICINE | Facility: OTHER | Age: 37
End: 2017-09-04

## 2017-09-04 ENCOUNTER — OFFICE VISIT - GICH (OUTPATIENT)
Dept: FAMILY MEDICINE | Facility: OTHER | Age: 37
End: 2017-09-04

## 2017-09-04 DIAGNOSIS — H57.89 OTHER SPECIFIED DISORDERS OF EYE AND ADNEXA: ICD-10-CM

## 2017-09-04 DIAGNOSIS — S05.01XA INJURY OF CONJUNCTIVA AND CORNEAL ABRASION OF RIGHT EYE WITHOUT FOREIGN BODY: ICD-10-CM

## 2017-09-04 DIAGNOSIS — H10.9 CONJUNCTIVITIS: ICD-10-CM

## 2017-09-08 ASSESSMENT — ANXIETY QUESTIONNAIRES
6. BECOMING EASILY ANNOYED OR IRRITABLE: SEVERAL DAYS
3. WORRYING TOO MUCH ABOUT DIFFERENT THINGS: MORE THAN HALF THE DAYS
IF YOU CHECKED OFF ANY PROBLEMS ON THIS QUESTIONNAIRE, HOW DIFFICULT HAVE THESE PROBLEMS MADE IT FOR YOU TO DO YOUR WORK, TAKE CARE OF THINGS AT HOME, OR GET ALONG WITH OTHER PEOPLE: SOMEWHAT DIFFICULT
5. BEING SO RESTLESS THAT IT IS HARD TO SIT STILL: MORE THAN HALF THE DAYS
1. FEELING NERVOUS, ANXIOUS, OR ON EDGE: SEVERAL DAYS
2. NOT BEING ABLE TO STOP OR CONTROL WORRYING: SEVERAL DAYS

## 2017-09-08 ASSESSMENT — PATIENT HEALTH QUESTIONNAIRE - PHQ9
5. POOR APPETITE OR OVEREATING: SEVERAL DAYS
SUM OF ALL RESPONSES TO PHQ QUESTIONS 1-9: 19

## 2017-10-04 ENCOUNTER — TELEPHONE (OUTPATIENT)
Dept: PSYCHIATRY | Facility: CLINIC | Age: 37
End: 2017-10-04

## 2017-10-04 NOTE — TELEPHONE ENCOUNTER
"-Incoming fax from [employer]  -Included is:    1.  Request for medical records dated 6/16/17 - present   2.  Signed JANET   3.  \"Attending Physician's Statement - Progress Report\" - which appears to either have been mailed or dropped off to clinic rather than faxed  -Request for records and JANET faxed to medical records dept at [phone]  -Form placed in provider folder for review/completion  -Next appt scheduled for 10/13/17    "

## 2017-10-09 ENCOUNTER — TELEPHONE (OUTPATIENT)
Dept: PSYCHIATRY | Facility: CLINIC | Age: 37
End: 2017-10-09

## 2017-10-09 NOTE — TELEPHONE ENCOUNTER
Patient Call - Forms  Received: Today       Rocio Gorman Katherine J RN       Phone Number: [phone]                     Brian Palmer is calling to clarify that he sent two separate forms for Yenny to fill out.  The first was from [employer] for disability.  The second was from [facility], which he needs in order to get distribution set up.  It is the second set of forms that he is most concerned about getting filled out.  He would like to confirm that we received them (they were sent in separately from the [employer] forms), and what the progress is in getting them filled out.  Brian can be reached at [phone].     Thanks,   Rocio

## 2017-10-09 NOTE — TELEPHONE ENCOUNTER
-Returned call to pt  -No answer at number provided  -LVM sharing writer had rec'd forms last week from [employer] (see note dated 10/4/17)  -Shared that provider was out last week and will be returning tmrw  -Request for records was sent to medical records dept  -Explained that there was not a due date indicated on forms that provider needed to complete  -Offered that writer would contact pt once forms had been completed  -If there were any questions or concerns in the meantime provided clinic number for c/b.

## 2017-10-09 NOTE — TELEPHONE ENCOUNTER
----- Message from Cuong Urrutia sent at 10/9/2017 12:04 PM CDT -----  Regarding: Pt Care  Contact: [phone]  Pt call 10/9/17@12:05pm regarding disability forms [employer]. Pt asked if either the nurse or provider Oneyda could please give him a call. Would like to know status on forms.    Thanks

## 2017-10-09 NOTE — TELEPHONE ENCOUNTER
-Called pt   -Confirmed two separate forms were rec'd  -Apologized as they were clipped together and writer did not notice second form  -Pt asks if [employer] form could be completed asap as he did not realize provider was out and has not rec'd payment for this month   -JANET for [employer] on file (see med tab)  -Will update provider  -Pt would like to be contacted once form(s) have been completed  -Confirmed appt for this Friday.

## 2017-10-13 ENCOUNTER — OFFICE VISIT (OUTPATIENT)
Dept: PSYCHIATRY | Facility: CLINIC | Age: 37
End: 2017-10-13
Attending: CLINICAL NURSE SPECIALIST
Payer: COMMERCIAL

## 2017-10-13 DIAGNOSIS — F90.0 ADHD (ATTENTION DEFICIT HYPERACTIVITY DISORDER), INATTENTIVE TYPE: Primary | ICD-10-CM

## 2017-10-13 DIAGNOSIS — F33.2 MAJOR DEPRESSIVE DISORDER, RECURRENT, SEVERE WITHOUT PSYCHOTIC FEATURES (H): ICD-10-CM

## 2017-10-13 DIAGNOSIS — G47.9 DISTURBANCE IN SLEEP BEHAVIOR: ICD-10-CM

## 2017-10-13 DIAGNOSIS — F45.9 SOMATIC PREOCCUPATION: ICD-10-CM

## 2017-10-13 DIAGNOSIS — F41.9 ANXIETY: ICD-10-CM

## 2017-10-13 RX ORDER — LITHIUM CARBONATE 300 MG/1
300 TABLET, FILM COATED, EXTENDED RELEASE ORAL DAILY
Start: 2017-10-13 | End: 2018-03-27 | Stop reason: DRUGHIGH

## 2017-10-13 RX ORDER — LITHIUM CARBONATE 300 MG/1
600 TABLET, FILM COATED, EXTENDED RELEASE ORAL DAILY
COMMUNITY
Start: 2017-10-13 | End: 2017-10-13 | Stop reason: DRUGHIGH

## 2017-10-13 NOTE — TELEPHONE ENCOUNTER
Spoke with pt notifying him that [facility] form was faxed last evening.  Encouraged to call and let us know if it was not rec'd.  Original sent to scanning.  Copy retained in clinic until scanning confirmed.

## 2017-10-13 NOTE — TELEPHONE ENCOUNTER
I faxed the [facility] Form and put it in your folder just in case. It read in the job log that it was successful but that hasn't always proofed that it was received.  I faxed a copy of my last note with it but kept the copy to send with the form for [employer] which is not done yet.   Yenny

## 2017-10-13 NOTE — MR AVS SNAPSHOT
After Visit Summary   10/13/2017    Brian Gaitan    MRN: [id number]           Patient Information     Date Of Birth          1980        Visit Information        Provider Department      10/13/2017 3:45 PM Yenny Call APRN [facility]        Today's Diagnoses     ADHD (attention deficit hyperactivity disorder), inattentive type    -  1    Major depressive disorder, recurrent, severe without psychotic features (H)        Anxiety        Somatic preoccupation        Sleep problems           Follow-ups after your visit        Follow-up notes from your care team     Return in about 4 weeks (around 11/10/2017).      Your next 10 appointments already scheduled     Nov 16, 2017  5:15 PM CST   Adult Med Follow UP with ABBY Contreras CNS   Psychiatry Clinic ([facility])    [place] [id number] [address] [facility] [address] [phone]              Who to contact     Please call your clinic at [phone] to:    Ask questions about your health    Make or cancel appointments    Discuss your medicines    Learn about your test results    Speak to your doctor   If you have compliments or concerns about an experience at your clinic, or if you wish to file a complaint, please contact [facility] Patient Relations at [phone] or email us at [email or web address]         Additional Information About Your Visit        MyChart Information     [employer] gives you secure access to your electronic health record. If you see a primary care provider, you can also send messages to your care team and make appointments. If you have questions, please call your primary care clinic.  If you do not have a primary care provider, please call [phone] and they will assist you.      [employer] is an electronic gateway that provides easy, online access to your medical records. With [employer], you can request a clinic  appointment, read your test results, renew a prescription or communicate with your care team.     To access your existing account, please contact your [facility] or call [phone] for assistance.        Care EveryWhere ID     This is your Care EveryWhere ID. This could be used by other organizations to access your [place] medical records  [id number]         Blood Pressure from Last 3 Encounters:   08/14/17 122/83   05/18/17 128/80   05/04/17 124/83    Weight from Last 3 Encounters:   08/14/17 99.6 kg (219 lb 8 oz)   05/18/17 97.4 kg (214 lb 12.8 oz)   05/04/17 99 kg (218 lb 3.2 oz)              Today, you had the following     No orders found for display         Today's Medication Changes          These changes are accurate as of: 10/13/17 11:59 PM.  If you have any questions, ask your nurse or doctor.               Start taking these medicines.        Dose/Directions    lithium 300 MG CR tablet   Commonly known as:  ESKALITH/LITHOBID   Used for:  Major depressive disorder, recurrent, severe without psychotic features (H)        Dose:  300 mg   Take 1 tablet (300 mg) by mouth daily   Refills:  0            Where to get your medicines      Some of these will need a paper prescription and others can be bought over the counter.  Ask your nurse if you have questions.     You don't need a prescription for these medications     lithium 300 MG CR tablet                Primary Care Provider Office Phone # Fax #    Barbara Damico [phone] [address] [place] [address]        Equal Access to Services     BERNARDINO KIM : Hadii alex knighto Soliliya, waaxda luqadaha, qaybta kaalmada sourav le. So [facility] [phone].    ATENCIÓN: Si habla español, tiene a yao disposición servicios gratuitos de asistencia lingüística. Llame al [phone].    We comply with applicable federal civil rights laws and [place]  laws. We do not discriminate on the basis of race, color, national origin, age, disability, sex, sexual orientation, or gender identity.            Thank you!     Thank you for choosing PSYCHIATRY CLINIC  for your care. Our goal is always to provide you with excellent care. Hearing back from our patients is one way we can continue to improve our services. Please take a few minutes to complete the written survey that you may receive in the mail after your visit with us. Thank you!             Your Updated Medication List - Protect others around you: Learn how to safely use, store and throw away your medicines at www.disposemymeds.org.          This list is accurate as of: 10/13/17 11:59 PM.  Always use your most recent med list.                   Brand Name Dispense Instructions for use Diagnosis    acetaminophen 325 MG tablet    TYLENOL    100 tablet    Take 2 tablets (650 mg) by mouth every 4 hours as needed for mild pain or headaches    Major depressive disorder, recurrent, severe without psychotic features (H)       buPROPion 150 MG 12 hr tablet    WELLBUTRIN SR    90 tablet    Take 1 tablet (150 mg) by mouth every morning    Major depressive disorder, recurrent, severe without psychotic features (H)       cholecalciferol 1000 UNITS Tabs     30 tablet    Take 1,000 Units by mouth daily    Major depressive disorder, recurrent, severe without psychotic features (H)       fluticasone 50 MCG/ACT spray    FLONASE    16 g    Spray 2 sprays into both nostrils daily    Nasal congestion       lamoTRIgine 200 MG tablet    LAMICTAL    90 tablet    Take 1 tablet (200 mg) by mouth daily    Major depressive disorder, recurrent, severe without psychotic features (H)       lithium 300 MG CR tablet    ESKALITH/LITHOBID     Take 1 tablet (300 mg) by mouth daily    Major depressive disorder, recurrent, severe without psychotic features (H)       miconazole 2 % cream    MICATIN    45 g    Apply topically 2 times daily as needed  (rash)    Major depressive disorder, recurrent, severe without psychotic features (H)       OLANZapine 5 MG tablet    zyPREXA    90 tablet    Take 1 tablet (5 mg) by mouth At Bedtime    Major depressive disorder, recurrent, severe without psychotic features (H)       prazosin 1 MG capsule    MINIPRESS    450 capsule    Take 4 to 5 capsules by mouth at bedtime    Anxiety       propranolol 10 MG tablet    INDERAL    60 tablet    Take 1 tablet (10 mg) by mouth 2 times daily    Anxiety       pseudoePHEDrine 30 MG tablet    SUDAFED     Take 1 tablet (30 mg) by mouth as needed for congestion

## 2017-10-13 NOTE — PROGRESS NOTES
Outpatient Psychiatry Progress Note     Provider: ABBY Contreras CNS  Date: 10/13/2017  Service:  Medication follow up with counseling.   Patient Identification: Brian Gaitan  : 1980   MRN: [id number]    Brian Gaitan is a 37 year old year old male who presents for ongoing psychiatric care.  Brian Gaitan was last seen in clinic on 17.   At that time,   Assessment & Plan       Brian Gaitan is seen today for follow up and reports he is feeling about the same. He did follow up with PCP about cardiac complaints and has been referred for further testing.  Not sure if Lamictal has had much effect but seems to be tolerating.  Review and discuss urinary concerns at follow up appointment.     Diagnosis   Anxiety with somatic features, Major Depression Recurrent severe, ADHD inattentive type        Plan:  Medication: Increase Lamictal to 200mg continue all other medications  OTC Recommendations: none  Lab Orders:  [place], CMP, TSH  Referrals: none  Release of Information: none  Future Treatment Considerations:per response to changes  Return for Follow Up: 4 weeks 17 at 4:15      ____________________________________________________________________________________________________________________________________________    10/13/2017   Had to cancel last appointment due to insurance not starting yet.   Today Brian reports he decreased lithium to 600 mg on 17.  With the decrease in dose he thinks he may be more clear headed but no increase in depression or anxiety, SI.   He continues to have nausea with at times dry heaves and that was why he wanted to decrease the dose. He still has this but maybe not as much.   Since living with his parents thinks this helps keep SI down.   He feels better than he did a year ago and not as hopeless that he won't get better. Isn't sure if it medication or just time that is making him better.   Has been seeing a therapist for about a month but therapist just  told him this week that he is retiring at the end of this month.  Has an appointment with the work force center in [place]. Hasn't worked since 1/22/16. No impulsive behavior.   Not sure if Lamictal has been helpful.     Side effects of medication include: weight gain maybe   Psychiatric Review of Systems:  The patient endorses symptoms of depression: In the last 2 weeks per PHQ 9 several days feelings of failure.  More than 1/2 days feeling depressed, SI, denies plan or intent.  Nearly everyday anhedonia, sleep disturbance, feelings of failure, concentration problems.   He  patient endorses symptoms of anxiety : low level constant which is unchanged  He endorses symptoms of pierre including none.    He endorses symptoms of psychosis including no psychotic symptoms.       Review of Medical Systems:  Sleep: no change, not sure if he sleeps or not.  Energy: continued low  Concentration: continued concerns  Appetite: increased  GI Concerns: weight gain  Cardiac concerns: no new concerns  Neurological concerns: no new concerns  Other medical concerns: no new concerns  Current Substance Use:  Alcohol:denies frequent use or abuse  Other drugs:denies  Caffeine:not reviewed  Nicotine: none  Past Medical History:   Past Medical History:   Diagnosis Date     Bundle branch block, right      Chronic back pain      Generalized anxiety disorder      Insomnia      Major depressive disorder, recurrent episode, moderate (H) 9/1/2011     Perforation of tympanic membrane, unspecified age 23     Seasonal allergies      Patient Active Problem List   Diagnosis     CARDIOVASCULAR SCREENING; LDL GOAL LESS THAN 160     Sleep problems     Cognitive complaints     ADHD (attention deficit hyperactivity disorder), inattentive type     Anxiety     Major depressive disorder, recurrent, severe without psychotic features (H)     Right bundle branch block (RBBB) on electrocardiogram (ECG)     Somatic preoccupation     Sinus congestion     Suicidal  "ideation       Allergies:   Allergies   Allergen Reactions     Penicillins Unknown          Current Medications     Current Outpatient Prescriptions Ordered in [facility]   Medication Sig Dispense Refill     lamoTRIgine (LAMICTAL) 200 MG tablet Take 1 tablet (200 mg) by mouth daily 90 tablet 1     OLANZapine (ZYPREXA) 5 MG tablet Take 1 tablet (5 mg) by mouth At Bedtime 90 tablet 1     buPROPion (WELLBUTRIN SR) 150 MG 12 hr tablet Take 1 tablet (150 mg) by mouth every morning 90 tablet 1     lithium (ESKALITH/LITHOBID) 300 MG CR tablet Take 3 tablets (900 mg) by mouth daily With food. 270 tablet 1     prazosin (MINIPRESS) 1 MG capsule Take 4 to 5 capsules by mouth at bedtime 450 capsule 1     propranolol (INDERAL) 10 MG tablet Take 1 tablet (10 mg) by mouth 2 times daily 60 tablet 5     pseudoePHEDrine (SUDAFED) 30 MG tablet Take 1 tablet (30 mg) by mouth as needed for congestion       acetaminophen (TYLENOL) 325 MG tablet Take 2 tablets (650 mg) by mouth every 4 hours as needed for mild pain or headaches 100 tablet 0     miconazole (MICATIN) 2 % cream Apply topically 2 times daily as needed (rash) 45 g 0     cholecalciferol 1000 UNITS TABS Take 1,000 Units by mouth daily 30 tablet 0     fluticasone (FLONASE) 50 MCG/ACT nasal spray Spray 2 sprays into both nostrils daily 16 g 5     [DISCONTINUED] QUEtiapine (SEROQUEL) 25 MG tablet Take 2-4 tablets at bedtime to address insomnia and sleep concerns  Take 1 tablet twice daily as needed for anxiety management 100 tablet 0     No current Epic-ordered facility-administered medications on file.         Mental Status Exam     Appearance:  Casually dressed and Adequately groomed  Behavior/relationship to examiner/demeanor:  Cooperative  Orientation: Oriented to person, place, time and situation  Psychomotor: normal form  Speech Rate:  Normal  Speech Spontaneity:  Normal  Mood:  \"okay\"  Affect:  Appropriate/mood-congruent and Blunted/Flat  Thought Process (Associations):  Goal " directed, Circumstantial and Perseverative  Thought Content:  no overt psychosis, patient does not appear to be responding to internal stimuli, Suicidal ideation and denies suicidal intent or plan  Abnormal Perception:  somatic  Attention/Concentration:  Normal  Language:  Intact  Insight:  Adequate  Judgment:  Adequate for safety      Results     Vital signs: There were no vitals taken for this visit.    Laboratory Data:  no new data available    Assessment & Plan      Brian Gaitan is seen today for follow up and reports he is not sure of effect of Lamictal.  Change and improvement continues to be very slow and living with his parents has maybe been the most stablizing for him given that he is still unable to work gainfully.  Has appointment for career/ employment abilities.  Does not have stable therapist at this time but has referral.  May have mild increase in somatic complaints.  Patient would like to decrease Lithium further due to possible side effects with GI and weight. Reviewed that if SI increases with further decrease he should return to previous dose.  WHODAS  2.0 completed with score of 23.  To be scanned to chart. Addended to note on 10/24/17.  Diagnosis   Anxiety with somatic features, Major Depression Recurrent severe, ADHD inattentive type    Plan:  Medication: Decrease Lithium to 300mg and continue all other medications  OTC Recommendations: none  Lab Orders:  none  Referrals: none  Release of Information: none  Future Treatment Considerations:none  Return for Follow Up:4 weeks   The risks, benefits, alternatives and side effects have been discussed and are understood by the patient. The patient understands the risks of using street drugs or alcohol. There are no medical contraindications, the patient agrees to treatment, and has the capacity to do so. The patient understands to call 911 or come to the nearest ED if life threatening or urgent symptoms present.  In addition time was spent  counseling the patient and/or coordinating care regarding review of social and occupational functioning.  In addition patient was counseled on health and wellness practices to augment medication treatment of symptoms. See note for details.    Yenny Call, APRN CNS 10/13/2017

## 2017-10-16 NOTE — TELEPHONE ENCOUNTER
Addended by: EDIE PIRES on: 10/16/2017 01:53 PM     Modules accepted: Orders     Writer returned phone call, per his request to notify him that he attended adult day treatment program for 45 session between the dates of 5/23/16 to 10/27/16. Writer assured that client can call treatment team with any other questions or concerns.    Ana Maria Ramsay, OTR/L

## 2017-10-18 ASSESSMENT — PATIENT HEALTH QUESTIONNAIRE - PHQ9: SUM OF ALL RESPONSES TO PHQ QUESTIONS 1-9: 17

## 2017-10-19 ENCOUNTER — TELEPHONE (OUTPATIENT)
Dept: PSYCHIATRY | Facility: CLINIC | Age: 37
End: 2017-10-19

## 2017-10-19 NOTE — TELEPHONE ENCOUNTER
-Rec'd second fax from [facility] requesting records from 6/16/17 to present  -JANET is included  -Request faxed to medical records dept at [phone]  -Originals also sent to medical records via tube system

## 2017-10-20 NOTE — TELEPHONE ENCOUNTER
Per Yenny Call, CNS, ABBY Maria form and last 2 progress notes in your folder. PHQ 9 has not been scanned yet and not available to send but was documented in notes.   Yenny

## 2017-10-20 NOTE — TELEPHONE ENCOUNTER
-Completed form and last two progress notes faxed to [facility] at [phone]  -LVM for pt with this update  -Requested call back if forms not rec'd  -Original sent to scanning  -Copy retained in clinic until scanning confirmed

## 2017-10-20 NOTE — TELEPHONE ENCOUNTER
-Completed form and progress notes faxed to [facility] at [phone]  -LVM for pt with this update  -Requested call back if forms not rec'd  -Original sent to scanning  -Copy retained in clinic until scanning confirmed

## 2017-11-28 ENCOUNTER — APPOINTMENT (OUTPATIENT)
Dept: LAB | Facility: CLINIC | Age: 37
End: 2017-11-28
Attending: CLINICAL NURSE SPECIALIST
Payer: COMMERCIAL

## 2017-11-28 ENCOUNTER — OFFICE VISIT (OUTPATIENT)
Dept: PSYCHIATRY | Facility: CLINIC | Age: 37
End: 2017-11-28
Attending: CLINICAL NURSE SPECIALIST
Payer: COMMERCIAL

## 2017-11-28 VITALS
HEART RATE: 74 BPM | SYSTOLIC BLOOD PRESSURE: 118 MMHG | BODY MASS INDEX: 29.37 KG/M2 | WEIGHT: 222.6 LBS | DIASTOLIC BLOOD PRESSURE: 77 MMHG

## 2017-11-28 DIAGNOSIS — F90.0 ADHD (ATTENTION DEFICIT HYPERACTIVITY DISORDER), INATTENTIVE TYPE: ICD-10-CM

## 2017-11-28 DIAGNOSIS — F33.2 MAJOR DEPRESSIVE DISORDER, RECURRENT, SEVERE WITHOUT PSYCHOTIC FEATURES (H): ICD-10-CM

## 2017-11-28 DIAGNOSIS — F45.9 SOMATIC PREOCCUPATION: ICD-10-CM

## 2017-11-28 DIAGNOSIS — F41.9 ANXIETY: Primary | ICD-10-CM

## 2017-11-28 DIAGNOSIS — Z79.899 ENCOUNTER FOR LONG-TERM (CURRENT) USE OF MEDICATIONS: ICD-10-CM

## 2017-11-28 LAB
ALBUMIN SERPL-MCNC: 3.8 G/DL (ref 3.4–5)
ALP SERPL-CCNC: 93 U/L (ref 40–150)
ALT SERPL W P-5'-P-CCNC: 64 U/L (ref 0–70)
ANION GAP SERPL CALCULATED.3IONS-SCNC: 6 MMOL/L (ref 3–14)
AST SERPL W P-5'-P-CCNC: 29 U/L (ref 0–45)
BILIRUB SERPL-MCNC: 0.4 MG/DL (ref 0.2–1.3)
BUN SERPL-MCNC: 14 MG/DL (ref 7–30)
CALCIUM SERPL-MCNC: 9.1 MG/DL (ref 8.5–10.1)
CHLORIDE SERPL-SCNC: 108 MMOL/L (ref 94–109)
CO2 SERPL-SCNC: 29 MMOL/L (ref 20–32)
CREAT SERPL-MCNC: 0.95 MG/DL (ref 0.66–1.25)
GFR SERPL CREATININE-BSD FRML MDRD: 90 ML/MIN/1.7M2
GLUCOSE SERPL-MCNC: 87 MG/DL (ref 70–99)
LITHIUM SERPL-SCNC: <0.2 MMOL/L (ref 0.6–1.2)
POTASSIUM SERPL-SCNC: 4 MMOL/L (ref 3.4–5.3)
PROT SERPL-MCNC: 7.8 G/DL (ref 6.8–8.8)
SODIUM SERPL-SCNC: 143 MMOL/L (ref 133–144)
T4 FREE SERPL-MCNC: 1.29 NG/DL (ref 0.76–1.46)
TSH SERPL DL<=0.005 MIU/L-ACNC: 1.92 MU/L (ref 0.4–4)

## 2017-11-28 PROCEDURE — 84439 ASSAY OF FREE THYROXINE: CPT | Performed by: CLINICAL NURSE SPECIALIST

## 2017-11-28 PROCEDURE — 80053 COMPREHEN METABOLIC PANEL: CPT | Performed by: CLINICAL NURSE SPECIALIST

## 2017-11-28 PROCEDURE — 99212 OFFICE O/P EST SF 10 MIN: CPT | Mod: ZF

## 2017-11-28 PROCEDURE — 84443 ASSAY THYROID STIM HORMONE: CPT | Performed by: CLINICAL NURSE SPECIALIST

## 2017-11-28 PROCEDURE — 80178 ASSAY OF LITHIUM: CPT | Performed by: CLINICAL NURSE SPECIALIST

## 2017-11-28 PROCEDURE — 36415 COLL VENOUS BLD VENIPUNCTURE: CPT | Performed by: CLINICAL NURSE SPECIALIST

## 2017-11-28 ASSESSMENT — PATIENT HEALTH QUESTIONNAIRE - PHQ9: SUM OF ALL RESPONSES TO PHQ QUESTIONS 1-9: 19

## 2017-11-28 NOTE — PROGRESS NOTES
Outpatient Psychiatry Progress Note     Provider: ABBY Contreras CNS  Date: 2017  Service:  Medication follow up with counseling.   Patient Identification: Brian Gaitan  : 1980   MRN: [id number]    Brian Gaitan is a 37 year old year old male who presents for ongoing psychiatric care.  Brian Gaitan was last seen in clinic on 10/13/17.   At that time,   Assessment & Plan       Brian Gaitan is seen today for follow up and reports he is not sure of effect of Lamictal.  Change and improvement continues to be very slow and living with his parents has maybe been the most stablizing for him given that he is still unable to work gainfully.  Has appointment for career/ employment abilities.  Does not have stable therapist at this time but has referral.  May have mild increase in somatic complaints.  Patient would like to decrease Lithium further due to possible side effects with GI and weight. Reviewed that if SI increases with further decrease he should return to previous dose.  WHODAS  2.0 completed with score of 23.  To be scanned to chart. Addended to note on 10/24/17.  Diagnosis   Anxiety with somatic features, Major Depression Recurrent severe, ADHD inattentive type     Plan:  Medication: Decrease Lithium to 300mg and continue all other medications  OTC Recommendations: none  Lab Orders:  none  Referrals: none  Release of Information: none  Future Treatment Considerations:none  Return for Follow Up:4 weeks      ____________________________________________________________________________________________________________________________________________    2017  Today Brian reports mood hasn't gotten worse but still not functioning well. He did meet with [employer] [facility] and now has a profile on [email or web address].  Hasn't scheduled with new therapist yet.   Side effects of medication include: some improvement in diarrhea  Psychiatric Review of Systems:  The patient endorses symptoms of  depression: In the last 2 weeks per PHQ several days appetite disturbance, restless/lethargy. More than 1/2 days anhedonia, feeling depressed, Passive suicidal thoughts. Nearly everyday sleep disturbance, fatigue, concentration problems. Suicidal thoughts daily but feels safe in current situation.    He  patient endorses symptoms of anxiety : no gambling, not as bothered by obsessive thoughts ( such as seeing people naked).  He endorses symptoms of pierre including none.    He endorses symptoms of psychosis including no psychotic symptoms.       Review of Medical Systems:  Sleep: no change  Energy: continued low  Concentration: continued problems  Appetite: mild  GI Concerns: none  Cardiac concerns: none  Neurological concerns: none  Other medical concerns: no new concerns  Current Substance Use:  Alcohol:denies frequent use or abuse  Other drugs:denies  Caffeine:very little  Nicotine: none  Past Medical History:   Past Medical History:   Diagnosis Date     Bundle branch block, right      Chronic back pain      Generalized anxiety disorder      Insomnia      Major depressive disorder, recurrent episode, moderate (H) 9/1/2011     Perforation of tympanic membrane, unspecified age 23     Seasonal allergies      Patient Active Problem List   Diagnosis     CARDIOVASCULAR SCREENING; LDL GOAL LESS THAN 160     Sleep problems     Cognitive complaints     ADHD (attention deficit hyperactivity disorder), inattentive type     Anxiety     Major depressive disorder, recurrent, severe without psychotic features (H)     Right bundle branch block (RBBB) on electrocardiogram (ECG)     Somatic preoccupation     Sinus congestion     Suicidal ideation       Allergies:   Allergies   Allergen Reactions     Penicillins Unknown          Current Medications     Current Outpatient Prescriptions Ordered in [facility]   Medication Sig Dispense Refill     lithium (ESKALITH/LITHOBID) 300 MG CR tablet Take 1 tablet (300 mg) by mouth daily        "lamoTRIgine (LAMICTAL) 200 MG tablet Take 1 tablet (200 mg) by mouth daily 90 tablet 1     OLANZapine (ZYPREXA) 5 MG tablet Take 1 tablet (5 mg) by mouth At Bedtime 90 tablet 1     buPROPion (WELLBUTRIN SR) 150 MG 12 hr tablet Take 1 tablet (150 mg) by mouth every morning 90 tablet 1     prazosin (MINIPRESS) 1 MG capsule Take 4 to 5 capsules by mouth at bedtime 450 capsule 1     propranolol (INDERAL) 10 MG tablet Take 1 tablet (10 mg) by mouth 2 times daily 60 tablet 5     pseudoePHEDrine (SUDAFED) 30 MG tablet Take 1 tablet (30 mg) by mouth as needed for congestion       acetaminophen (TYLENOL) 325 MG tablet Take 2 tablets (650 mg) by mouth every 4 hours as needed for mild pain or headaches 100 tablet 0     miconazole (MICATIN) 2 % cream Apply topically 2 times daily as needed (rash) 45 g 0     cholecalciferol 1000 UNITS TABS Take 1,000 Units by mouth daily 30 tablet 0     fluticasone (FLONASE) 50 MCG/ACT nasal spray Spray 2 sprays into both nostrils daily 16 g 5     [DISCONTINUED] QUEtiapine (SEROQUEL) 25 MG tablet Take 2-4 tablets at bedtime to address insomnia and sleep concerns  Take 1 tablet twice daily as needed for anxiety management 100 tablet 0     No current Epic-ordered facility-administered medications on file.         Mental Status Exam     Appearance:  Casually dressed and Well groomed  Behavior/relationship to examiner/demeanor:  Cooperative  Orientation: Oriented to person, place, time and situation  Psychomotor: normal form  Speech Rate:  Normal  Speech Spontaneity:  Mild latency  Mood:  \"the same\"  Affect:  Appropriate/mood-congruent and Blunted/Flat  Thought Process (Associations):  Goal directed and Circumstantial  Thought Content:  no overt psychosis, patient does not appear to be responding to internal stimuli, Suicidal ideation and denies suicidal intent or plan  Abnormal Perception:  None  Attention/Concentration:  Normal  Language:  Intact  Insight:  Adequate  Judgment:  Adequate for " safety      Results     Vital signs: /77  Pulse 74  Wt 101 kg (222 lb 9.6 oz)  BMI 29.37 kg/m2    Laboratory Data:  No lithium labs for almost a year.    Assessment & Plan      Brian Gaitan is seen today for follow up and reports he is feeling about the same with lower dose of lithium. He is wondering about discontinuing Wellbutrin at this time.  Some continued somatic focus and anhedonia, low motivation, energy continue to be difficult. Has not yet scheduled with new therapist. This and also appointments here will be expensive for him due to change in insurance.  Discussed looking for job where he would have a regular schedule and work around other people.    Diagnosis  Anxiety with somatic features, Major Depression Recurrent severe, ADHD inattentive type    Plan:  Medication: discontinue Wellbutrin, continue all other medications.  OTC Recommendations: none  Lab Orders:  Lithium, TSH, CMP.    Referrals: none  Release of Information: none  Future Treatment Considerations:per symptoms  Return for Follow Up:4 weeks will consider by phone.   The risks, benefits, alternatives and side effects have been discussed and are understood by the patient. The patient understands the risks of using street drugs or alcohol. There are no medical contraindications, the patient agrees to treatment, and has the capacity to do so. The patient understands to call 911 or come to the nearest ED if life threatening or urgent symptoms present.  In addition time was spent counseling the patient and/or coordinating care regarding review of social and occupational functioning.  In addition patient was counseled on health and wellness practices to augment medication treatment of symptoms. See note for details.    ABBY Contreras CNS 11/28/2017

## 2017-11-28 NOTE — MR AVS SNAPSHOT
After Visit Summary   11/28/2017    Brian Gaitan    MRN: [id number]           Patient Information     Date Of Birth          1980        Visit Information        Provider Department      11/28/2017 1:15 PM Yenny Call APRN [facility]        Today's Diagnoses     Anxiety    -  1    Major depressive disorder, recurrent, severe without psychotic features (H)        ADHD (attention deficit hyperactivity disorder), inattentive type        Somatic preoccupation        Encounter for long-term (current) use of medications           Follow-ups after your visit        Follow-up notes from your care team     Return in about 4 weeks (around 12/26/2017).      Your next 10 appointments already scheduled     Jan 04, 2018  5:15 PM CST   Adult Med Follow UP with ABBY Contreras CNS   Psychiatry Clinic ([facility])    [address]  [address] [facility] [id number] [phone]              Who to contact     Please call your clinic at [phone] to:    Ask questions about your health    Make or cancel appointments    Discuss your medicines    Learn about your test results    Speak to your doctor   If you have compliments or concerns about an experience at your clinic, or if you wish to file a complaint, please contact [facility] Patient Relations at [phone] or email us at [email or web address]         Additional Information About Your Visit        MyChart Information     [employer] gives you secure access to your electronic health record. If you see a primary care provider, you can also send messages to your care team and make appointments. If you have questions, please call your primary care clinic.  If you do not have a primary care provider, please call [phone] and they will assist you.      [employer] is an electronic gateway that provides easy, online access to your medical records. With  [employer], you can request a clinic appointment, read your test results, renew a prescription or communicate with your care team.     To access your existing account, please contact your [facility] or call [phone] for assistance.        Care EveryWhere ID     This is your Care EveryWhere ID. This could be used by other organizations to access your [place] medical records  [id number]        Your Vitals Were     Pulse BMI (Body Mass Index)                74 29.37 kg/m2           Blood Pressure from Last 3 Encounters:   11/28/17 118/77   08/14/17 122/83   05/18/17 128/80    Weight from Last 3 Encounters:   11/28/17 101 kg (222 lb 9.6 oz)   08/14/17 99.6 kg (219 lb 8 oz)   05/18/17 97.4 kg (214 lb 12.8 oz)              We Performed the Following     Comprehensive metabolic panel     Lithium level     T4 FREE     TSH        Primary Care Provider Office Phone # Fax #    Barbara Brandie Damico [phone]       [address] [place] [address]        Equal Access to Services     : Hadii alex chavis hadasho Soliliya, waaxda luqadaha, qaybta kaalmagiovanni le, sourav mackay . So [facility] [phone].    ATENCIÓN: Si habla español, tiene a yao disposición servicios gratuitos de asistencia lingüística. Luizaame al [phone].    We comply with applicable federal civil rights laws and [place] laws. We do not discriminate on the basis of race, color, national origin, age, disability, sex, sexual orientation, or gender identity.            Thank you!     Thank you for choosing PSYCHIATRY CLINIC  for your care. Our goal is always to provide you with excellent care. Hearing back from our patients is one way we can continue to improve our services. Please take a few minutes to complete the written survey that you may receive in the mail after your visit with us. Thank you!             Your Updated Medication List - Protect others around  you: Learn how to safely use, store and throw away your medicines at www.disposemymeds.org.          This list is accurate as of: 11/28/17 11:59 PM.  Always use your most recent med list.                   Brand Name Dispense Instructions for use Diagnosis    acetaminophen 325 MG tablet    TYLENOL    100 tablet    Take 2 tablets (650 mg) by mouth every 4 hours as needed for mild pain or headaches    Major depressive disorder, recurrent, severe without psychotic features (H)       cholecalciferol 1000 UNITS Tabs     30 tablet    Take 1,000 Units by mouth daily    Major depressive disorder, recurrent, severe without psychotic features (H)       fluticasone 50 MCG/ACT spray    FLONASE    16 g    Spray 2 sprays into both nostrils daily    Nasal congestion       lamoTRIgine 200 MG tablet    LAMICTAL    90 tablet    Take 1 tablet (200 mg) by mouth daily    Major depressive disorder, recurrent, severe without psychotic features (H)       lithium 300 MG CR tablet    ESKALITH/LITHOBID     Take 1 tablet (300 mg) by mouth daily    Major depressive disorder, recurrent, severe without psychotic features (H)       miconazole 2 % cream    MICATIN    45 g    Apply topically 2 times daily as needed (rash)    Major depressive disorder, recurrent, severe without psychotic features (H)       OLANZapine 5 MG tablet    zyPREXA    90 tablet    Take 1 tablet (5 mg) by mouth At Bedtime    Major depressive disorder, recurrent, severe without psychotic features (H)       prazosin 1 MG capsule    MINIPRESS    450 capsule    Take 4 to 5 capsules by mouth at bedtime    Anxiety       propranolol 10 MG tablet    INDERAL    60 tablet    Take 1 tablet (10 mg) by mouth 2 times daily    Anxiety       pseudoePHEDrine 30 MG tablet    SUDAFED     Take 1 tablet (30 mg) by mouth as needed for congestion

## 2017-12-21 ENCOUNTER — TELEPHONE (OUTPATIENT)
Dept: PSYCHIATRY | Facility: CLINIC | Age: 37
End: 2017-12-21

## 2017-12-21 NOTE — TELEPHONE ENCOUNTER
"-Rec'd form from [facility]  -Requesting completion/signature of enclosed form(s): Attending Physician's Progress Report  -Also included is \"Insured's Progress Report\" completed by pt  -Purpose: For benefits to continue beyond 1/9/18  -JANET included: Yes  -Placed in provider's folder   -Per note on forms pt would like to be called once form has been sent    "

## 2017-12-28 NOTE — PROGRESS NOTES
Patient Information     Patient Name MRN Sex Brian Blandon [id number] Male 1980      Progress Notes by Mercedes Blood NP at 2017  2:30 PM     Author:  Mercedes Blood NP  Service:  (none) Author Type:  PHYS- Nurse Practitioner     Filed:  2017  4:40 PM  Encounter Date:  2017 Status:  Addendum     :  Mercedes Blood NP (PHYS- Nurse Practitioner)        Related Notes: Original Note by Mercedes Blood NP (PHYS- Nurse Practitioner) filed at 2017  4:39 PM            HPI:    Brian Gaitan is a 36 y.o. male who presents to clinic today for eye concern.  Right eye with swelling, burning and irritation symptoms worsening over the past 3 days.   States pain in the eye socket.  Foreign body sensation yesterday.  This morning the eyelid was swollen and had difficulty opening the eye for a few hours.  Redness in the right eye for the past 2 days.  Light sensitivity.  Wears contacts.  Normally does not sleep in his contacts but states he did 2 nights ago.  Wearing just left contact today and is not wearing the right contact today.  No headaches.  States balance is off and his perception seems off, states he has bumped into a few things the past few days.  No treatments tried.        Nursing Notes:   Lu Umaña  2017  2:54 PM  Signed  Patient presents to the clinic for sore right eye x3 days. Patient reports burning and swelling.  Lu MARTINEZ CMA.......2017..2:45 PM      Lu Umaña  2017  4:13 PM  Signed  Fluorescein sodium opthalmic strips ordered by Mercedes LEWIS.  Medication administered per order in [employer]   Lot # [id number]   Exp: 2018  NDC 00832-527-48  Patient tolerated well.  Lu Umaña...2017..4:10 PM    Tetracaine hydrochloride ordered by Mercedes LEWIS.  Medication administered per order in [employer]   Lot # [id number]  Exp. 2019  Patient tolerated well.  Lu Umaña...2017..4:11 PM    Sodium Chloride ordered by Mercedes Blood  "C-NP.  [facility]   Lot # [id number]  Exp. 11/2018  Patient tolerated well.  Lu Umaña...9/4/2017..4:12 PM        No past medical history on file.  No past surgical history on file.  Social History       Substance Use Topics         Smoking status:   Never Smoker     Smokeless tobacco:   Never Used     Alcohol use   Yes      Comment: occasionally      Current Outpatient Prescriptions       Medication  Sig Dispense Refill     buPROPion (WELLBUTRIN SR) 150 mg Sustained-Release tablet TK 1 T PO QAM  5     lamoTRIgine (LAMICTAL) 200 mg tablet TK 1 T PO  QD  1     lithium carbonate (LITHOBID) 300 mg Controlled-Release tablet TK 1 T PO  TID WITH FOOD  1     OLANzapine (ZYPREXA) 5 mg tablet TK 1 T PO  QHS  1     prazosin (MINIPRESS) 1 mg capsule TK 4 TO 5 CAPSULES PO QHS  1     propranolol (INDERAL) 10 mg tablet TK 1 T PO BID  5     No current facility-administered medications for this visit.      Medications have been reviewed by me and are current to the best of my knowledge and ability.    Allergies     Allergen  Reactions     Penicillins Rash       Past medical history, past surgical history, current medications and allergies reviewed and accurate to the best of my knowledge.        ROS:  Refer to HPI    /70  Pulse 76  Temp 97.9  F (36.6  C) (Tympanic)   Ht 1.848 m (6' 0.75\")  Wt 100.2 kg (220 lb 14.1 oz)  BMI 29.34 kg/m2    EXAM:  General Appearance: Well appearing adult male, appropriate appearance for age. No acute distress  Head: normocephalic, atraumatic  Eyes: Right palpebral and bulbar conjunctivae with erythema and irritation, no subconjunctival hemorrhage noted, cornea clear, no drainage appreciated.  Left bulbar conjunctivae without erythema or irritation, cornea clear, no drainage appreciated.  PERRLA.  EOMs intact, no discomfort with movement.  Bilateral eyelids without erythema or swelling.  Mild tenderness to palpation over the right eyebrow, no swelling.  " Fluorescein staining of right eye reveals a pinpoint circular corneal abrasion.  No foreign body appreciated of the right eye or under the right eyelids.  No visible hypopyon or hyphema of bilateral eyes.    Respiratory: Normal effort.    Musculoskeletal:  Normal gait.  Equal movement of bilateral upper extremities.  Equal movement of bilateral lower extremities.    Dermatological: no facial erythema or rash   Psychological: normal affect, alert and pleasant        ASSESSMENT/PLAN:    ICD-10-CM    1. Irritation of right eye H57.8    2. Redness of right eye H57.8    3. Right corneal abrasion, initial encounter S05.01XA tobramycin (TOBREX) 0.3 % ophthalmic solution   4. Conjunctivitis of right eye, unspecified conjunctivitis type H10.9 tobramycin (TOBREX) 0.3 % ophthalmic solution         Tobramycin eye drops - 1 drop QID x 5 to 7 days   No wearing contacts for the next week.  Highly recommend not sleeping in contacts.    Symptomatic treatment - lubricating eye drops PRN, cool moist compresses PRN, etc   Tylenol or ibuprofen PRN  Follow up with ophthalmologist in the next couple of days for further evaluation if symptoms persist or worsen or concerns          Patient Instructions   Tobramycin eye drops - 1 drop 4 x day for 5 to 7 days   Follow up with ophthalmologist later this week       Index Senegalese   Corneal Abrasion   ________________________________________________________________________  KEY POINTS    A corneal abrasion is a scratch on the clear outer layer on the front of your eye.    Treatment may include eye drops or ointment. If you wear contact lenses, your healthcare provider may ask you to wait one week or longer after your cornea has healed before you wear your contact lenses again.  ________________________________________________________________________  What is a corneal abrasion?   A corneal abrasion is a scratch on the surface of the cornea, which is the clear outer layer on the front of your eye.  Corneal abrasions are usually very painful.  Most corneal abrasions heal in a day or two. Some abrasions will take longer to heal.   What is the cause?   Corneal abrasions can be caused by:    Getting poked or hit in your eye    Getting scratched in your eye with any object, such as a fingernail, comb, or twig    Getting something in your eye, such as a splinter, dirt, or eye makeup    Chipped or cracked contact lenses, or wearing lenses too long  What are the symptoms?   Symptoms may include:    Redness, pain, and watery eyes    A scratchy feeling or feeling like there is something in your eye    Painful sensitivity to light    Blurry vision    Eyelid spasms  How is it diagnosed?   Your healthcare provider will ask about your symptoms and activities and examine your eye. Using eye drops and a light that makes an abrasion easier to see, your provider will look at your eye. The drops contain a dye that will make your vision and tears yellow for a few minutes.  How is it treated?   If something is still in your eye, your healthcare provider will remove it.  Your healthcare provider may:    Give you antibiotic drops or ointment to prevent an infection.    Give you eye drops or ointment to help relieve pain.    If you also have eyelid spasms, or severe sensitivity to light, your provider may give you eye drops that dilate your pupil, which relaxes the muscles in your eye and reduces pain.    Place a contact lens bandage over your cornea. The bandage helps to speed up healing and reduces eye pain.  If you wear contact lenses, your healthcare provider may ask you to wait one week or longer after your cornea has healed before you wear your contact lenses again.   How can I take care of myself?   Follow the full course of treatment your healthcare provider prescribes. Ask your healthcare provider:    How long it will take to recover    If there are activities you should avoid and when you can return to your normal  activities    How to take care of yourself at home    What symptoms or problems you should watch for and what to do if you have them  Make sure you know when you should come back for a checkup. Keep all appointments for provider visits or tests.  How can I help prevent a corneal abrasion?    Once you ve had a corneal abrasion, you are at risk for a repeat abrasion in the same area. It may help to use artificial tears or eye ointment to lubricate your eyes well after an abrasion has healed.    To help prevent severe eye injuries, wear safety eyewear when you:    Do any work around the house that requires hammering, power tools, chemicals, or splatter of any kind    Play sports such as paintball, racquetball, lacrosse, hockey, and fast-pitch softball    Shoot firearms or use explosives of any kind    Are in a high-risk area such as a construction site or shooting range    Follow your eye care provider's instructions for wearing and caring for contact lenses. Do not wear them longer than recommended.  Reviewed for medical accuracy by faculty at the [facility] at [facility]. Web site: [email or web address]  Developed by [employer].  Adult Advisor 2016.3 published by [employer].  Last modified: 2015-09-22  Last reviewed: 2015-09-21  This content is reviewed periodically and is subject to change as new health information becomes available. The information is intended to inform and educate and is not a replacement for medical evaluation, advice, diagnosis or treatment by a healthcare professional.  References   Adult Advisor 2016.3 Index    Copyright   2016 [employer], a division of [employer] All rights reserved.

## 2017-12-28 NOTE — PATIENT INSTRUCTIONS
Patient Information     Patient Name MRN Brian Forman [id number] Male 1980      Patient Instructions by Merecdes Blood NP at 2017  3:16 PM     Author:  Mercedes Blood NP  Service:  (none) Author Type:  PHYS- Nurse Practitioner     Filed:  2017  3:19 PM  Encounter Date:  2017 Status:  Addendum     :  Mercedes Blood NP (PHYS- Nurse Practitioner)        Related Notes: Original Note by Mercedes Blood NP (PHYS- Nurse Practitioner) filed at 2017  3:17 PM            Tobramycin eye drops - 1 drop 4 x day for 5 to 7 days   Follow up with ophthalmologist later this week       Index Uzbek   Corneal Abrasion   ________________________________________________________________________  KEY POINTS    A corneal abrasion is a scratch on the clear outer layer on the front of your eye.    Treatment may include eye drops or ointment. If you wear contact lenses, your healthcare provider may ask you to wait one week or longer after your cornea has healed before you wear your contact lenses again.  ________________________________________________________________________  What is a corneal abrasion?   A corneal abrasion is a scratch on the surface of the cornea, which is the clear outer layer on the front of your eye. Corneal abrasions are usually very painful.  Most corneal abrasions heal in a day or two. Some abrasions will take longer to heal.   What is the cause?   Corneal abrasions can be caused by:    Getting poked or hit in your eye    Getting scratched in your eye with any object, such as a fingernail, comb, or twig    Getting something in your eye, such as a splinter, dirt, or eye makeup    Chipped or cracked contact lenses, or wearing lenses too long  What are the symptoms?   Symptoms may include:    Redness, pain, and watery eyes    A scratchy feeling or feeling like there is something in your eye    Painful sensitivity to light    Blurry vision    Eyelid spasms  How is it  diagnosed?   Your healthcare provider will ask about your symptoms and activities and examine your eye. Using eye drops and a light that makes an abrasion easier to see, your provider will look at your eye. The drops contain a dye that will make your vision and tears yellow for a few minutes.  How is it treated?   If something is still in your eye, your healthcare provider will remove it.  Your healthcare provider may:    Give you antibiotic drops or ointment to prevent an infection.    Give you eye drops or ointment to help relieve pain.    If you also have eyelid spasms, or severe sensitivity to light, your provider may give you eye drops that dilate your pupil, which relaxes the muscles in your eye and reduces pain.    Place a contact lens bandage over your cornea. The bandage helps to speed up healing and reduces eye pain.  If you wear contact lenses, your healthcare provider may ask you to wait one week or longer after your cornea has healed before you wear your contact lenses again.   How can I take care of myself?   Follow the full course of treatment your healthcare provider prescribes. Ask your healthcare provider:    How long it will take to recover    If there are activities you should avoid and when you can return to your normal activities    How to take care of yourself at home    What symptoms or problems you should watch for and what to do if you have them  Make sure you know when you should come back for a checkup. Keep all appointments for provider visits or tests.  How can I help prevent a corneal abrasion?    Once you ve had a corneal abrasion, you are at risk for a repeat abrasion in the same area. It may help to use artificial tears or eye ointment to lubricate your eyes well after an abrasion has healed.    To help prevent severe eye injuries, wear safety eyewear when you:    Do any work around the house that requires hammering, power tools, chemicals, or splatter of any kind    Play sports  such as paintball, racquetball, lacrosse, hockey, and fast-pitch softball    Shoot firearms or use explosives of any kind    Are in a high-risk area such as a construction site or shooting range    Follow your eye care provider's instructions for wearing and caring for contact lenses. Do not wear them longer than recommended.  Reviewed for medical accuracy by faculty at the [facility] at [facility]. Web site: [email or web address]/[email or web address]  Developed by [employer].  Adult Advisor 2016.3 published by [employer].  Last modified: 2015-09-22  Last reviewed: 2015-09-21  This content is reviewed periodically and is subject to change as new health information becomes available. The information is intended to inform and educate and is not a replacement for medical evaluation, advice, diagnosis or treatment by a healthcare professional.  References   Adult Advisor 2016.3 Index    Copyright   2016 [employer], a division of [employer] All rights reserved.

## 2017-12-30 NOTE — NURSING NOTE
Patient Information     Patient Name MRN Sex Brian Courtney [id number] Male 1980      Nursing Note by Lu Umaña at 2017  2:30 PM     Author:  Lu Umaña Service:  (none) Author Type:  (none)     Filed:  2017  2:54 PM Encounter Date:  2017 Status:  Signed     :  Lu Umaña            Patient presents to the clinic for sore right eye x3 days. Patient reports burning and swelling.  Lu MARTINEZ, ANGELICA.......2017..2:45 PM

## 2017-12-30 NOTE — NURSING NOTE
Patient Information     Patient Name MRN Brian Forman [id number] Male 1980      Nursing Note by Lu Umaña at 2017  2:30 PM     Author:  Lu Umaña Service:  (none) Author Type:  (none)     Filed:  2017  4:13 PM Encounter Date:  2017 Status:  Signed     :  Lu Umaña            Fluorescein sodium opthalmic strips ordered by Mercedes LEWIS.  Medication administered per order in [email or web address]   Lot # [id number]   Exp: 2018  NDC 60152-014-42  Patient tolerated well.  Lu Umaña...2017..4:10 PM    Tetracaine hydrochloride ordered by Mercedes LEWIS.  Medication administered per order in [email or web address]   Lot # [id number]  Exp. 2019  Patient tolerated well.  Lu Umaña...2017..4:11 PM    Sodium Chloride ordered by Mercedes LEWIS.  Medication administered per order in [email or web address]   Lot # [id number]  Exp. 2018  Patient tolerated well.  Lu Umaña...2017..4:12 PM

## 2018-01-04 ENCOUNTER — OFFICE VISIT (OUTPATIENT)
Dept: PSYCHIATRY | Facility: CLINIC | Age: 38
End: 2018-01-04
Attending: CLINICAL NURSE SPECIALIST
Payer: COMMERCIAL

## 2018-01-04 VITALS
HEART RATE: 68 BPM | SYSTOLIC BLOOD PRESSURE: 122 MMHG | DIASTOLIC BLOOD PRESSURE: 80 MMHG | WEIGHT: 221.2 LBS | BODY MASS INDEX: 29.19 KG/M2

## 2018-01-04 DIAGNOSIS — F41.9 ANXIETY: Primary | ICD-10-CM

## 2018-01-04 DIAGNOSIS — F45.9 SOMATIC PREOCCUPATION: ICD-10-CM

## 2018-01-04 DIAGNOSIS — F90.0 ADHD (ATTENTION DEFICIT HYPERACTIVITY DISORDER), INATTENTIVE TYPE: ICD-10-CM

## 2018-01-04 DIAGNOSIS — F33.2 MAJOR DEPRESSIVE DISORDER, RECURRENT, SEVERE WITHOUT PSYCHOTIC FEATURES (H): ICD-10-CM

## 2018-01-04 PROCEDURE — G0463 HOSPITAL OUTPT CLINIC VISIT: HCPCS | Mod: ZF

## 2018-01-04 RX ORDER — PROPRANOLOL HYDROCHLORIDE 10 MG/1
10 TABLET ORAL 2 TIMES DAILY
Qty: 60 TABLET | Refills: 5 | Status: SHIPPED | OUTPATIENT
Start: 2018-01-04

## 2018-01-04 RX ORDER — PRAZOSIN HYDROCHLORIDE 1 MG/1
CAPSULE ORAL
Start: 2018-01-04 | End: 2018-02-28

## 2018-01-04 NOTE — PROGRESS NOTES
Outpatient Psychiatry Progress Note     Provider: ABBY Contreras CNS  Date: 2018  Service:  Medication follow up with counseling.   Patient Identification: Brian Gaitan  : 1980   MRN: [id number]    Brian Gaitan is a 37 year old year old male who presents for ongoing psychiatric care.  Brian Gaitan was last seen in clinic on 17.   At that time,   Assessment & Plan       Brian Gaitan is seen today for follow up and reports he is feeling about the same with lower dose of lithium. He is wondering about discontinuing Wellbutrin at this time.  Some continued somatic focus and anhedonia, low motivation, energy continue to be difficult. Has not yet scheduled with new therapist. This and also appointments here will be expensive for him due to change in insurance.  Discussed looking for job where he would have a regular schedule and work around other people.     Diagnosis  Anxiety with somatic features, Major Depression Recurrent severe, ADHD inattentive type     Plan:  Medication: discontinue Wellbutrin, continue all other medications.  OTC Recommendations: none  Lab Orders:  Lithium, TSH, CMP.    Referrals: none  Release of Information: none  Future Treatment Considerations:per symptoms  Return for Follow Up:4 weeks will consider by phone      ____________________________________________________________________________________________________________________________________________    2018  Today Brian reports since of Wellbutrin he has noticed any change good or bad.  Energy is still very low. Gets up between 11 and noon and goes to bed between 11 and midnight. Although he is sleeping he doesn't feel like it.  His family and friends tell him that he snores very loud.  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  He has been to the [facility] for an assessment but hasn't followed up.  Has an appointment for therapy next week but might check with other  therapists since it costs $170.   Spent time with friends over the holidays  Side effects of medication include: none known  Psychiatric Review of Systems:  The patient endorses symptoms of depression: In the last 2 weeks per PHQ 9 several days appetite disturbance, restless/lethargy.  More than 1/2 days feelings of failure, suicidal ideation.  Nearly everyday anhedonia, sleep disturbance, fatigue, appetite disturbance. Denies suicidal plan or intent.  He  patient endorses symptoms of anxiety : continues to have problems with perseveration.  He endorses symptoms of pierre including none.    He endorses symptoms of psychosis including no psychotic symptoms.       Review of Medical Systems:  Sleep: see subjective  Energy: no change  Concentration: no change  Appetite: stable  GI Concerns: none  Cardiac concerns: none  Neurological concerns: none  Other medical concerns: no new concerns  Current Substance Use:  Alcohol:denies frequent use or abuse  Other drugs:denies  Caffeine:not reviewed  Nicotine: none  Past Medical History:   Past Medical History:   Diagnosis Date     Bundle branch block, right      Chronic back pain      Generalized anxiety disorder      Insomnia      Major depressive disorder, recurrent episode, moderate (H) 9/1/2011     Perforation of tympanic membrane, unspecified age 23     Seasonal allergies      Patient Active Problem List   Diagnosis     CARDIOVASCULAR SCREENING; LDL GOAL LESS THAN 160     Sleep problems     Cognitive complaints     ADHD (attention deficit hyperactivity disorder), inattentive type     Anxiety     Major depressive disorder, recurrent, severe without psychotic features (H)     Right bundle branch block (RBBB) on electrocardiogram (ECG)     Somatic preoccupation     Sinus congestion     Suicidal ideation       Allergies:   Allergies   Allergen Reactions     Penicillins Unknown          Current Medications     Current Outpatient Prescriptions Ordered in Epic   Medication Sig  "Dispense Refill     lithium (ESKALITH/LITHOBID) 300 MG CR tablet Take 1 tablet (300 mg) by mouth daily       lamoTRIgine (LAMICTAL) 200 MG tablet Take 1 tablet (200 mg) by mouth daily 90 tablet 1     OLANZapine (ZYPREXA) 5 MG tablet Take 1 tablet (5 mg) by mouth At Bedtime 90 tablet 1     prazosin (MINIPRESS) 1 MG capsule Take 4 to 5 capsules by mouth at bedtime 450 capsule 1     propranolol (INDERAL) 10 MG tablet Take 1 tablet (10 mg) by mouth 2 times daily 60 tablet 5     pseudoePHEDrine (SUDAFED) 30 MG tablet Take 1 tablet (30 mg) by mouth as needed for congestion       acetaminophen (TYLENOL) 325 MG tablet Take 2 tablets (650 mg) by mouth every 4 hours as needed for mild pain or headaches 100 tablet 0     miconazole (MICATIN) 2 % cream Apply topically 2 times daily as needed (rash) 45 g 0     cholecalciferol 1000 UNITS TABS Take 1,000 Units by mouth daily 30 tablet 0     fluticasone (FLONASE) 50 MCG/ACT nasal spray Spray 2 sprays into both nostrils daily 16 g 5     [DISCONTINUED] QUEtiapine (SEROQUEL) 25 MG tablet Take 2-4 tablets at bedtime to address insomnia and sleep concerns  Take 1 tablet twice daily as needed for anxiety management 100 tablet 0     No current Epic-ordered facility-administered medications on file.         Mental Status Exam     Appearance:  Casually dressed and Well groomed  Behavior/relationship to examiner/demeanor:  Cooperative  Orientation: Oriented to person, place, time and situation  Psychomotor: normal form  Speech Rate:  Normal  Speech Spontaneity:  Normal  Mood:  \"the same\"  Affect:  Appropriate/mood-congruent  Thought Process (Associations):  Goal directed and Circumstantial  Thought Content:  no overt psychosis, patient does not appear to be responding to internal stimuli, Suicidal ideation and denies suicidal intent or plan  Abnormal Perception:  perseverations  Attention/Concentration:  Normal  Language:  Intact  Insight:  Fair  Judgment:  Adequate for safety      Results "     Vital signs: /80  Pulse 68  Wt 100.3 kg (221 lb 3.2 oz)  BMI 29.19 kg/m2    Laboratory Data:  reviewed data ordered at last appointment    Assessment & Plan      Brian Gaitan is seen today for follow up and reports his mood is about the same which is still much better than after his last hospitalization in 2016. He would like to decrease and discontinue Prazosin.   Also agrees to call the job support program and see about meeting with someone to help him get started on applying for jobs.    Diagnosis  Anxiety with somatic features, Major Depression Recurrent severe, ADHD inattentive type    Plan:  Medication: Continue current dose of Lithium, Olanzapine, Lamictal, Propranolol. Decrease Prazosin by 1mg a week and then discontinue  OTC Recommendations: none  Lab Orders:  none  Referrals: none  Release of Information: none  Future Treatment Considerations:per symptoms  Return for Follow Up:6  weeks    The risks, benefits, alternatives and side effects have been discussed and are understood by the patient. The patient understands the risks of using street drugs or alcohol. There are no medical contraindications, the patient agrees to treatment, and has the capacity to do so. The patient understands to call 911 or come to the nearest ED if life threatening or urgent symptoms present.  In addition time was spent counseling the patient and/or coordinating care regarding review of social and occupational functioning.  In addition patient was counseled on health and wellness practices to augment medication treatment of symptoms. See note for details.    Yenny Call, APRN CNS 1/4/2018

## 2018-01-04 NOTE — MR AVS SNAPSHOT
After Visit Summary   1/4/2018    Brian Gaitan    MRN: [id number]           Patient Information     Date Of Birth          1980        Visit Information        Provider Department      1/4/2018 5:15 PM Yenny Call APRN [facility]        Today's Diagnoses     Anxiety    -  1    Major depressive disorder, recurrent, severe without psychotic features (H)        Somatic preoccupation        ADHD (attention deficit hyperactivity disorder), inattentive type           Follow-ups after your visit        Follow-up notes from your care team     Return in about 6 weeks (around 2/15/2018).      Who to contact     Please call your clinic at [phone] to:    Ask questions about your health    Make or cancel appointments    Discuss your medicines    Learn about your test results    Speak to your doctor   If you have compliments or concerns about an experience at your clinic, or if you wish to file a complaint, please contact [facility] Patient Relations at [phone] or email us at [email or web address]         Additional Information About Your Visit        MyChart Information     Tictail gives you secure access to your electronic health record. If you see a primary care provider, you can also send messages to your care team and make appointments. If you have questions, please call your primary care clinic.  If you do not have a primary care provider, please call [phone] and they will assist you.      Tictail is an electronic gateway that provides easy, online access to your medical records. With Tictail, you can request a clinic appointment, read your test results, renew a prescription or communicate with your care team.     To access your existing account, please contact your [facility] or call [phone] for assistance.        Care EveryWhere ID     This is your Care EveryWhere ID. This could be used by other  organizations to access your [facility] medical records  [id number]        Your Vitals Were     Pulse BMI (Body Mass Index)                68 29.19 kg/m2           Blood Pressure from Last 3 Encounters:   01/04/18 122/80   11/28/17 118/77   08/14/17 122/83    Weight from Last 3 Encounters:   01/04/18 100.3 kg (221 lb 3.2 oz)   11/28/17 101 kg (222 lb 9.6 oz)   08/14/17 99.6 kg (219 lb 8 oz)              Today, you had the following     No orders found for display         Today's Medication Changes          These changes are accurate as of: 1/4/18  6:19 PM.  If you have any questions, ask your nurse or doctor.               These medicines have changed or have updated prescriptions.        Dose/Directions    prazosin 1 MG capsule   Commonly known as:  MINIPRESS   This may have changed:  additional instructions   Used for:  Anxiety   Changed by:  Yenny Call APRN CNS        Decrease from 4 tablet by one tablet per week and then discontinue.   Refills:  0            Where to get your medicines      These medications were sent to [facility] [address] [place] [address] RD S AT [facility] & [address], [facility] [address]     Phone:  [phone]     propranolol 10 MG tablet         Some of these will need a paper prescription and others can be bought over the counter.  Ask your nurse if you have questions.     You don't need a prescription for these medications     prazosin 1 MG capsule                Primary Care Provider Office Phone # Fax #    Barbara Damico [phone]       [address] [place] [address] A  [facility] [address]        Equal Access to Services     BERNARDINO KIM AH: Hadmel Machado, germán lusagar, qaybkyleigh kaalsourav gabriel. So [facility] [phone].    ATENCIÓN: Si habla español, tiene a yao disposición servicios gratuitos de asistencia lingüística. Llame al  [phone].    We comply with applicable federal civil rights laws and [place] laws. We do not discriminate on the basis of race, color, national origin, age, disability, sex, sexual orientation, or gender identity.            Thank you!     Thank you for choosing [facility]IN[facility]  for your care. Our goal is always to provide you with excellent care. Hearing back from our patients is one way we can continue to improve our services. Please take a few minutes to complete the written survey that you may receive in the mail after your visit with us. Thank you!             Your Updated Medication List - Protect others around you: Learn how to safely use, store and throw away your medicines at www.disposemymeds.org.          This list is accurate as of: 1/4/18  6:19 PM.  Always use your most recent med list.                   Brand Name Dispense Instructions for use Diagnosis    acetaminophen 325 MG tablet    TYLENOL    100 tablet    Take 2 tablets (650 mg) by mouth every 4 hours as needed for mild pain or headaches    Major depressive disorder, recurrent, severe without psychotic features (H)       cholecalciferol 1000 UNITS Tabs     30 tablet    Take 1,000 Units by mouth daily    Major depressive disorder, recurrent, severe without psychotic features (H)       fluticasone 50 MCG/ACT spray    FLONASE    16 g    Spray 2 sprays into both nostrils daily    Nasal congestion       lamoTRIgine 200 MG tablet    LAMICTAL    90 tablet    Take 1 tablet (200 mg) by mouth daily    Major depressive disorder, recurrent, severe without psychotic features (H)       lithium 300 MG CR tablet    ESKALITH/LITHOBID     Take 1 tablet (300 mg) by mouth daily    Major depressive disorder, recurrent, severe without psychotic features (H)       miconazole 2 % cream    MICATIN    45 g    Apply topically 2 times daily as needed (rash)    Major depressive disorder, recurrent, severe without psychotic features (H)       OLANZapine 5 MG tablet     zyPREXA    90 tablet    Take 1 tablet (5 mg) by mouth At Bedtime    Major depressive disorder, recurrent, severe without psychotic features (H)       prazosin 1 MG capsule    MINIPRESS     Decrease from 4 tablet by one tablet per week and then discontinue.    Anxiety       propranolol 10 MG tablet    INDERAL    60 tablet    Take 1 tablet (10 mg) by mouth 2 times daily    Anxiety       pseudoePHEDrine 30 MG tablet    SUDAFED     Take 1 tablet (30 mg) by mouth as needed for congestion

## 2018-01-04 NOTE — NURSING NOTE
Chief Complaint   Patient presents with     Recheck Medication     Anxiety        Reviewed allergies, smoking status, and pharmacy preference  Administered abuse screening questions   Obtained weight, blood pressure and heart rate

## 2018-01-05 ASSESSMENT — PATIENT HEALTH QUESTIONNAIRE - PHQ9: SUM OF ALL RESPONSES TO PHQ QUESTIONS 1-9: 21

## 2018-01-25 VITALS
WEIGHT: 220.88 LBS | HEIGHT: 73 IN | BODY MASS INDEX: 29.27 KG/M2 | HEART RATE: 76 BPM | SYSTOLIC BLOOD PRESSURE: 118 MMHG | DIASTOLIC BLOOD PRESSURE: 70 MMHG | TEMPERATURE: 97.9 F

## 2018-02-24 ENCOUNTER — DOCUMENTATION ONLY (OUTPATIENT)
Dept: FAMILY MEDICINE | Facility: OTHER | Age: 38
End: 2018-02-24

## 2018-02-24 RX ORDER — TOBRAMYCIN 3 MG/ML
1 SOLUTION/ DROPS OPHTHALMIC 4 TIMES DAILY
COMMUNITY
Start: 2017-09-04

## 2018-02-24 RX ORDER — LITHIUM CARBONATE 300 MG/1
1 TABLET, FILM COATED, EXTENDED RELEASE ORAL
COMMUNITY
Start: 2017-08-25 | End: 2018-02-28

## 2018-02-24 RX ORDER — OLANZAPINE 5 MG/1
1 TABLET ORAL AT BEDTIME
COMMUNITY
Start: 2017-08-24 | End: 2018-02-28

## 2018-02-24 RX ORDER — BUPROPION HYDROCHLORIDE 150 MG/1
1 TABLET, EXTENDED RELEASE ORAL EVERY MORNING
COMMUNITY
Start: 2017-08-16 | End: 2018-02-28

## 2018-02-24 RX ORDER — PROPRANOLOL HYDROCHLORIDE 10 MG/1
1 TABLET ORAL 2 TIMES DAILY
COMMUNITY
Start: 2017-08-17 | End: 2018-02-28

## 2018-02-24 RX ORDER — PRAZOSIN HYDROCHLORIDE 1 MG/1
4-5 CAPSULE ORAL AT BEDTIME
COMMUNITY
Start: 2017-08-24 | End: 2018-02-28

## 2018-02-24 RX ORDER — LAMOTRIGINE 200 MG/1
1 TABLET ORAL DAILY
COMMUNITY
Start: 2017-08-25 | End: 2018-02-28

## 2018-02-28 ENCOUNTER — OFFICE VISIT (OUTPATIENT)
Dept: PSYCHIATRY | Facility: CLINIC | Age: 38
End: 2018-02-28
Attending: CLINICAL NURSE SPECIALIST
Payer: COMMERCIAL

## 2018-02-28 VITALS
WEIGHT: 218.6 LBS | BODY MASS INDEX: 29.04 KG/M2 | DIASTOLIC BLOOD PRESSURE: 87 MMHG | SYSTOLIC BLOOD PRESSURE: 129 MMHG | HEART RATE: 78 BPM

## 2018-02-28 DIAGNOSIS — F33.2 MAJOR DEPRESSIVE DISORDER, RECURRENT, SEVERE WITHOUT PSYCHOTIC FEATURES (H): ICD-10-CM

## 2018-02-28 DIAGNOSIS — G47.9 DISTURBANCE IN SLEEP BEHAVIOR: ICD-10-CM

## 2018-02-28 DIAGNOSIS — F41.9 ANXIETY: Primary | ICD-10-CM

## 2018-02-28 DIAGNOSIS — F63.0 GAMBLING DISORDER, EPISODIC: ICD-10-CM

## 2018-02-28 DIAGNOSIS — F45.9 SOMATIC PREOCCUPATION: ICD-10-CM

## 2018-02-28 PROCEDURE — G0463 HOSPITAL OUTPT CLINIC VISIT: HCPCS | Mod: ZF

## 2018-02-28 RX ORDER — LAMOTRIGINE 200 MG/1
200 TABLET ORAL DAILY
Qty: 90 TABLET | Refills: 1 | Status: SHIPPED | OUTPATIENT
Start: 2018-02-28 | End: 2018-06-26

## 2018-02-28 RX ORDER — LITHIUM CARBONATE 450 MG
450 TABLET, EXTENDED RELEASE ORAL DAILY
Qty: 30 TABLET | Refills: 1 | Status: SHIPPED | OUTPATIENT
Start: 2018-02-28 | End: 2018-05-30

## 2018-02-28 RX ORDER — OLANZAPINE 5 MG/1
5 TABLET ORAL AT BEDTIME
Qty: 90 TABLET | Refills: 1 | Status: SHIPPED | OUTPATIENT
Start: 2018-02-28 | End: 2018-05-30

## 2018-02-28 ASSESSMENT — PAIN SCALES - GENERAL: PAINLEVEL: SEVERE PAIN (7)

## 2018-02-28 NOTE — PROGRESS NOTES
Outpatient Psychiatry Progress Note     Provider: ABBY Contreras CNS  Date: 2018  Service:  Medication follow up with counseling.   Patient Identification: Brian Gaitan  : 1980   MRN: [id number]    Brian Gaitan is a 37 year old year old male who presents for ongoing psychiatric care.  Brian Gaitan was last seen in clinic on 18.   At that time,   Assessment & Plan       Brian Gaitan is seen today for follow up and reports his mood is about the same which is still much better than after his last hospitalization in 2016. He would like to decrease and discontinue Prazosin.   Also agrees to call the job support program and see about meeting with someone to help him get started on applying for jobs.     Diagnosis  Anxiety with somatic features, Major Depression Recurrent severe, ADHD inattentive type     Plan:  Medication: Continue current dose of Lithium, Olanzapine, Lamictal, Propranolol. Decrease Prazosin by 1mg a week and then discontinue  OTC Recommendations: none  Lab Orders:  none  Referrals: none  Release of Information: none  Future Treatment Considerations:per symptoms  Return for Follow Up:6  weeks       ____________________________________________________________________________________________________________________________________________    2018  Today Brian reports mood has been worse lately. He thinks that this might be due to his disability running out in .  Doesn't think he could work full time or everyday. Met with [place] [employer] [facility] and did testing.   Was in [place] with his uncle in February.  Uncle was sick when patient was there. Was there with his parents. Met with therapist before he went on the trip. Has seen her 2 times so far.   Gambled and lost $5,000. Had taken this out of his [address] and still has $200,000 left.   Side effects of medication include: unclear  Psychiatric Review of Systems:  Depression: In the last 2 weeks per PHQ 9 several days appetite  disturbance, restless/lethargy.  More than 1/2 days fatigue, feelings of failure, SI.  Nearly everyday anhedonia, feeling depressed, sleep disturbance, concentration problems  Anxiety : see subjective  Maegan none   Psychosis  none.   ADHD no new concerns    Review of Medical Systems:  Sleep: feels it has decreased  Energy: low  Concentration: continued difficulty  Appetite: no change  GI Concerns: none  Cardiac concerns: none  Neurological concerns: no new concerns  Other medical concerns: no new concerns  Current Substance Use:  Alcohol:denies  Other drugs:denies  Caffeine:no change  Nicotine: none  Past Medical History:   Past Medical History:   Diagnosis Date     Bundle branch block, right      Chronic back pain      Generalized anxiety disorder      Insomnia      Major depressive disorder, recurrent episode, moderate (H) 9/1/2011     Perforation of tympanic membrane, unspecified age 23     Seasonal allergies      Patient Active Problem List   Diagnosis     CARDIOVASCULAR SCREENING; LDL GOAL LESS THAN 160     Sleep problems     Cognitive complaints     ADHD (attention deficit hyperactivity disorder), inattentive type     Anxiety     Major depressive disorder, recurrent, severe without psychotic features (H)     Right bundle branch block (RBBB) on electrocardiogram (ECG)     Somatic preoccupation     Sinus congestion     Suicidal ideation       Allergies:   Allergies   Allergen Reactions     Penicillins Unknown and Rash          Current Medications     Current Outpatient Prescriptions Ordered in [facility]   Medication Sig Dispense Refill     propranolol (INDERAL) 10 MG tablet Take 1 tablet (10 mg) by mouth 2 times daily 60 tablet 5     lithium (ESKALITH/LITHOBID) 300 MG CR tablet Take 1 tablet (300 mg) by mouth daily       lamoTRIgine (LAMICTAL) 200 MG tablet Take 1 tablet (200 mg) by mouth daily 90 tablet 1     OLANZapine (ZYPREXA) 5 MG tablet Take 1 tablet (5 mg) by mouth At Bedtime 90 tablet 1     acetaminophen  "(TYLENOL) 325 MG tablet Take 2 tablets (650 mg) by mouth every 4 hours as needed for mild pain or headaches 100 tablet 0     buPROPion (WELLBUTRIN SR) 150 MG 12 hr tablet Take 1 tablet by mouth every morning       tobramycin (TOBREX) 0.3 % ophthalmic solution Place 1 drop into the right eye 4 times daily       lamoTRIgine (LAMICTAL) 200 MG tablet Take 1 tablet by mouth daily       lithium (ESKALITH/LITHOBID) 300 MG CR tablet Take 1 tablet by mouth 3 times daily (with meals)       OLANZapine (ZYPREXA) 5 MG tablet Take 1 tablet by mouth At Bedtime       prazosin (MINIPRESS) 1 MG capsule Take 4-5 capsules by mouth At Bedtime       propranolol (INDERAL) 10 MG tablet Take 1 tablet by mouth 2 times daily       prazosin (MINIPRESS) 1 MG capsule Decrease from 4 tablet by one tablet per week and then discontinue. (Patient not taking: Reported on 2/28/2018)       pseudoePHEDrine (SUDAFED) 30 MG tablet Take 1 tablet (30 mg) by mouth as needed for congestion       miconazole (MICATIN) 2 % cream Apply topically 2 times daily as needed (rash) 45 g 0     cholecalciferol 1000 UNITS TABS Take 1,000 Units by mouth daily (Patient not taking: Reported on 2/28/2018) 30 tablet 0     fluticasone (FLONASE) 50 MCG/ACT nasal spray Spray 2 sprays into both nostrils daily (Patient not taking: Reported on 2/28/2018) 16 g 5     [DISCONTINUED] QUEtiapine (SEROQUEL) 25 MG tablet Take 2-4 tablets at bedtime to address insomnia and sleep concerns  Take 1 tablet twice daily as needed for anxiety management 100 tablet 0     No current Epic-ordered facility-administered medications on file.         Mental Status Exam     Appearance:  Casually dressed and Adequately groomed  Behavior/relationship to examiner/demeanor:  Cooperative  Orientation: Oriented to person, place, time and situation  Psychomotor: normal form  Speech Rate:  Normal  Speech Spontaneity:  Normal  Mood:  \"not very good\"  Affect:  Appropriate/mood-congruent, Dysphoric and " Anxious/Nervous  Thought Process (Associations):  Goal directed, Circumstantial and Perseverative  Thought Content:  no overt psychosis, patient denies auditory and visual hallucinations, patient does not appear to be responding to internal stimuli, Suicidal ideation and denies suicidal intent or plan  Abnormal Perception:  perseveration  Attention/Concentration:  Normal  Insight:  Fair  Judgment:  Adequate for safety      Results     Vital signs: /87  Pulse 78  Wt 99.2 kg (218 lb 9.6 oz)  BMI 29.04 kg/m2    Laboratory Data:  no new data    Assessment & Plan      Brian Gaitan is seen today for follow up and reports he has been having more difficulty due to gambling loss of $5000 while in [place] with family and also will be losing disability later this year. Continues to perseverate on work and moving forward. Has not been in therapy. Patient asks about trial of Fluoxetine. I recommend also that he increase lithium to at least 450mg since decrease in dose from 600 to 450mg may have increased symptoms  Brian has looked into Gamblers Anonymous and I encouraged to attend.      Diagnosis  Anxiety with somatic features, Major Depression Recurrent severe, ADHD inattentive type, Episodic compulsive gambling    Plan:  Medication: Increase Lithium CR 450mg, Fluoxetine 10mg.  Continue all other medications  OTC Recommendations: none  Lab Orders:  none  Referrals: none  Release of Information: [facility]  Future Treatment Considerations:further increase in Fluoxetine  Return for Follow Up: 4weeks   The risks, benefits, alternatives and side effects have been discussed and are understood by the patient. The patient understands the risks of using street drugs or alcohol. There are no medical contraindications, the patient agrees to treatment, and has the capacity to do so. The patient understands to call 911 or come to the nearest ED if life threatening or urgent symptoms present.  In addition time was spent  counseling the patient and/or coordinating care regarding review of social and occupational functioning.  In addition patient was counseled on health and wellness practices to augment medication treatment of symptoms. See note for details.    Yenny Call, APRN CNS 2/28/2018

## 2018-02-28 NOTE — MR AVS SNAPSHOT
After Visit Summary   2/28/2018    Brian Gaitan    MRN: [id number]           Patient Information     Date Of Birth          1980        Visit Information        Provider Department      2/28/2018 3:45 PM Yenny Call APRN [facility]        Today's Diagnoses     Anxiety    -  1    Major depressive disorder, recurrent, severe without psychotic features (H)        Somatic preoccupation        Sleep problems        Major depressive disorder, recurrent, severe without psychotic features        Gambling disorder, episodic           Follow-ups after your visit        Follow-up notes from your care team     Return in about 4 weeks (around 3/28/2018).      Your next 10 appointments already scheduled     Mar 27, 2018  4:45 PM CDT   Adult Med Follow UP with ABBY Contreras CNS   Psychiatry Clinic ([facility])    [place] [id number] [address] [address] [phone]              Who to contact     Please call your clinic at [phone] to:    Ask questions about your health    Make or cancel appointments    Discuss your medicines    Learn about your test results    Speak to your doctor            Additional Information About Your Visit        MyChart Information     Joyus gives you secure access to your electronic health record. If you see a primary care provider, you can also send messages to your care team and make appointments. If you have questions, please call your primary care clinic.  If you do not have a primary care provider, please call [phone] and they will assist you.      Joyus is an electronic gateway that provides easy, online access to your medical records. With Joyus, you can request a clinic appointment, read your test results, renew a prescription or communicate with your care team.     To access your existing account, please contact your [facility] or call  [phone] for assistance.        Care EveryWhere ID     This is your Care EveryWhere ID. This could be used by other organizations to access your [facility] medical records  [id number]        Your Vitals Were     Pulse BMI (Body Mass Index)                78 29.04 kg/m2           Blood Pressure from Last 3 Encounters:   02/28/18 129/87   01/04/18 122/80   11/28/17 118/77    Weight from Last 3 Encounters:   02/28/18 99.2 kg (218 lb 9.6 oz)   01/04/18 100.3 kg (221 lb 3.2 oz)   11/28/17 101 kg (222 lb 9.6 oz)              Today, you had the following     No orders found for display         Today's Medication Changes          These changes are accurate as of 2/28/18 11:59 PM.  If you have any questions, ask your nurse or doctor.               Start taking these medicines.        Dose/Directions    FLUoxetine 10 MG capsule   Commonly known as:  PROzac   Used for:  Anxiety, Major depressive disorder, recurrent, severe without psychotic features (H), Somatic preoccupation   Started by:  Yenny Call APRN CNS        Dose:  10 mg   Take 1 capsule (10 mg) by mouth daily   Quantity:  30 capsule   Refills:  0         These medicines have changed or have updated prescriptions.        Dose/Directions    lamoTRIgine 200 MG tablet   Commonly known as:  LAMICTAL   This may have changed:  Another medication with the same name was removed. Continue taking this medication, and follow the directions you see here.   Used for:  Major depressive disorder, recurrent, severe without psychotic features (H)   Changed by:  Yenny Call APRN CNS        Dose:  200 mg   Take 1 tablet (200 mg) by mouth daily   Quantity:  90 tablet   Refills:  1       * lithium 300 MG CR tablet   Commonly known as:  ESKALITH/LITHOBID   This may have changed:  Another medication with the same name was added. Make sure you understand how and when to take each.   Used for:  Major depressive disorder, recurrent, severe without psychotic features  (H)   Changed by:  Yenny Call APRN CNS        Dose:  300 mg   Take 1 tablet (300 mg) by mouth daily   Refills:  0       * lithium 450 MG CR tablet   Commonly known as:  ESKALITH   This may have changed:  You were already taking a medication with the same name, and this prescription was added. Make sure you understand how and when to take each.   Used for:  Major depressive disorder, recurrent, severe without psychotic features (H)   Changed by:  Yenny Call APRN CNS        Dose:  450 mg   Take 1 tablet (450 mg) by mouth daily   Quantity:  30 tablet   Refills:  1       * Notice:  This list has 2 medication(s) that are the same as other medications prescribed for you. Read the directions carefully, and ask your doctor or other care provider to review them with you.         Where to get your medicines      These medications were sent to [facility] [address] [place] [address] AT [facility] & [address] [address]     Phone:  [phone]     FLUoxetine 10 MG capsule    lamoTRIgine 200 MG tablet    lithium 450 MG CR tablet    OLANZapine 5 MG tablet                Primary Care Provider Office Phone # Fax #    Barbara Damico [phone]       [address] [place] [address]        Equal Access to Services     BERNARDINO KIM AH: Ernst chavis hadasho Soliliya, waaxda luqadaha, qaybta kaalmada adeegmichi, sourav donohue. So [facility] [phone].    ATENCIÓN: Si habla español, tiene a yao disposición servicios gratuitos de asistencia lingüística. Maame al [phone].    We comply with applicable federal civil rights laws and [place] laws. We do not discriminate on the basis of race, color, national origin, age, disability, sex, sexual orientation, or gender identity.            Thank you!     Thank you for choosing PSYCHIATRY CLINIC  for your care. Our goal is always to  provide you with excellent care. Hearing back from our patients is one way we can continue to improve our services. Please take a few minutes to complete the written survey that you may receive in the mail after your visit with us. Thank you!             Your Updated Medication List - Protect others around you: Learn how to safely use, store and throw away your medicines at www.disposemymeds.org.          This list is accurate as of 2/28/18 11:59 PM.  Always use your most recent med list.                   Brand Name Dispense Instructions for use Diagnosis    acetaminophen 325 MG tablet    TYLENOL    100 tablet    Take 2 tablets (650 mg) by mouth every 4 hours as needed for mild pain or headaches    Major depressive disorder, recurrent, severe without psychotic features (H)       cholecalciferol 1000 UNITS Tabs     30 tablet    Take 1,000 Units by mouth daily    Major depressive disorder, recurrent, severe without psychotic features (H)       FLUoxetine 10 MG capsule    PROzac    30 capsule    Take 1 capsule (10 mg) by mouth daily    Anxiety, Major depressive disorder, recurrent, severe without psychotic features (H), Somatic preoccupation       fluticasone 50 MCG/ACT spray    FLONASE    16 g    Spray 2 sprays into both nostrils daily    Nasal congestion       lamoTRIgine 200 MG tablet    LAMICTAL    90 tablet    Take 1 tablet (200 mg) by mouth daily    Major depressive disorder, recurrent, severe without psychotic features (H)       * lithium 300 MG CR tablet    ESKALITH/LITHOBID     Take 1 tablet (300 mg) by mouth daily    Major depressive disorder, recurrent, severe without psychotic features (H)       * lithium 450 MG CR tablet    ESKALITH    30 tablet    Take 1 tablet (450 mg) by mouth daily    Major depressive disorder, recurrent, severe without psychotic features (H)       miconazole 2 % cream    MICATIN    45 g    Apply topically 2 times daily as needed (rash)    Major depressive disorder, recurrent, severe  without psychotic features (H)       OLANZapine 5 MG tablet    zyPREXA    90 tablet    Take 1 tablet (5 mg) by mouth At Bedtime    Major depressive disorder, recurrent, severe without psychotic features (H)       propranolol 10 MG tablet    INDERAL    60 tablet    Take 1 tablet (10 mg) by mouth 2 times daily    Anxiety       pseudoePHEDrine 30 MG tablet    SUDAFED     Take 1 tablet (30 mg) by mouth as needed for congestion        tobramycin 0.3 % ophthalmic solution    TOBREX     Place 1 drop into the right eye 4 times daily        * Notice:  This list has 2 medication(s) that are the same as other medications prescribed for you. Read the directions carefully, and ask your doctor or other care provider to review them with you.

## 2018-02-28 NOTE — NURSING NOTE
Chief Complaint   Patient presents with     Recheck Medication     anxiety, MDD     Reviewed allergies, medications, pharmacy and smoking status.  Administered abuse screening questions     Obtained weight, pain level, blood pressure and heart rate

## 2018-03-02 ENCOUNTER — TELEPHONE (OUTPATIENT)
Dept: PSYCHIATRY | Facility: CLINIC | Age: 38
End: 2018-03-02

## 2018-03-02 RX ORDER — FLUOXETINE 10 MG/1
10 CAPSULE ORAL DAILY
Qty: 30 CAPSULE | Refills: 0 | Status: SHIPPED | OUTPATIENT
Start: 2018-03-02 | End: 2018-03-27 | Stop reason: DRUGHIGH

## 2018-03-02 NOTE — TELEPHONE ENCOUNTER
Rec'd fax from [facility] pharmacy stating:  Pt was expecting 10 mg fluoxetine daily to be sent in.       LS 2/28/18 and the note is open.  Routed to Yenny Call CNS APRN for authorization to provide RF.

## 2018-03-05 PROBLEM — F63.0: Status: ACTIVE | Noted: 2018-03-05

## 2018-03-13 ASSESSMENT — PATIENT HEALTH QUESTIONNAIRE - PHQ9: SUM OF ALL RESPONSES TO PHQ QUESTIONS 1-9: 20

## 2018-03-27 ENCOUNTER — OFFICE VISIT (OUTPATIENT)
Dept: PSYCHIATRY | Facility: CLINIC | Age: 38
End: 2018-03-27
Attending: CLINICAL NURSE SPECIALIST
Payer: COMMERCIAL

## 2018-03-27 VITALS
WEIGHT: 216.4 LBS | BODY MASS INDEX: 28.75 KG/M2 | HEART RATE: 80 BPM | SYSTOLIC BLOOD PRESSURE: 112 MMHG | DIASTOLIC BLOOD PRESSURE: 80 MMHG

## 2018-03-27 DIAGNOSIS — F63.0 GAMBLING DISORDER, EPISODIC: ICD-10-CM

## 2018-03-27 DIAGNOSIS — F41.9 ANXIETY: ICD-10-CM

## 2018-03-27 DIAGNOSIS — F45.9 SOMATIC PREOCCUPATION: ICD-10-CM

## 2018-03-27 DIAGNOSIS — F90.0 ADHD (ATTENTION DEFICIT HYPERACTIVITY DISORDER), INATTENTIVE TYPE: Primary | ICD-10-CM

## 2018-03-27 DIAGNOSIS — F33.2 MAJOR DEPRESSIVE DISORDER, RECURRENT, SEVERE WITHOUT PSYCHOTIC FEATURES (H): ICD-10-CM

## 2018-03-27 PROCEDURE — G0463 HOSPITAL OUTPT CLINIC VISIT: HCPCS | Mod: ZF

## 2018-03-27 ASSESSMENT — PAIN SCALES - GENERAL: PAINLEVEL: SEVERE PAIN (7)

## 2018-03-27 NOTE — NURSING NOTE
Chief Complaint   Patient presents with     RECHECK     Anxiety     Reviewed allergies, smoking status, pharmacy preference and medications  Obtained weight, blood pressure and heart rate

## 2018-03-27 NOTE — PROGRESS NOTES
Outpatient Psychiatry Progress Note     Provider: ABBY Contreras CNS  Date: 3/27/2018  Service:  Medication follow up with counseling.   Patient Identification: Brian Gaitan  : 1980   MRN: [id number]    Brian Gaitan is a 37 year old year old male who presents for ongoing psychiatric care.  Brian Gaitan was last seen in clinic on 18.   At that time,   Assessment & Plan       Brian Gaitan is seen today for follow up and reports he has been having more difficulty due to gambling loss of $5000 while in [place] with family and also will be losing disability later this year. Continues to perseverate on work and moving forward. Has not been in therapy. Patient asks about trial of Fluoxetine. I recommend also that he increase lithium to at least 450mg since decrease in dose from 600 to 450mg may have increased symptoms  Brian has looked into Gamblers Anonymous and I encouraged to attend.        Diagnosis  Anxiety with somatic features, Major Depression Recurrent severe, ADHD inattentive type, Episodic compulsive gambling     Plan:  Medication: Increase Lithium CR 450mg, Fluoxetine 10mg.  Continue all other medications  OTC Recommendations: none  Lab Orders:  none  Referrals: none  Release of Information: [facility]  Future Treatment Considerations:further increase in Fluoxetine  Return for Follow Up: 4weeks      ____________________________________________________________________________________________________________________________________________    2018  Today Brian reports he wasn't able to start  Fluoxetine right away. Has been on it at least 2 weeks.  He has not noticed any effect but not sure. Mood is still pretty flat. Has not noticed side effects or change in sleep.   He met with eileen at [facility] again concerning Testosterone being low but not determined yet.  Also started with new therapist who would like to do testing for Autism.  Side effects of medication include: no  change noted  Psychiatric Review of Systems:  Depression: In the last 2 weeks per PHQ 9 several days restless/lethargy. More than 1/2 days feelings of failure, SI.  Nearly everyday anhedonia, feeling depressed, sleep disturbance, fatigue, concentration problems.  Anxiety : increased with gambling and somatic symptoms but was not on Fluoxetine  Maegan n/a   Psychosis  n/a.   ADHD no change, not currently treated    Review of Medical Systems:  Sleep: no change  Energy: no change  Concentration: no change  Appetite: no change  GI Concerns: no change  Cardiac concerns: none  Neurological concerns: none  Other medical concerns: checking about testosterone again  Current Substance Use:  Alcohol:denies frequent use or abuse  Other drugs:denies  Caffeine:occasional  Nicotine: none  Past Medical History:   Past Medical History:   Diagnosis Date     Bundle branch block, right      Chronic back pain      Generalized anxiety disorder      Insomnia      Major depressive disorder, recurrent episode, moderate (H) 9/1/2011     Perforation of tympanic membrane, unspecified age 23     Seasonal allergies      Patient Active Problem List   Diagnosis     CARDIOVASCULAR SCREENING; LDL GOAL LESS THAN 160     Sleep problems     Cognitive complaints     ADHD (attention deficit hyperactivity disorder), inattentive type     Anxiety     Major depressive disorder, recurrent, severe without psychotic features (H)     Right bundle branch block (RBBB) on electrocardiogram (ECG)     Somatic preoccupation     Sinus congestion     Suicidal ideation     Gambling disorder, episodic       Allergies:   Allergies   Allergen Reactions     Penicillins Unknown and Rash          Current Medications     Current Outpatient Prescriptions Ordered in [facility]   Medication Sig Dispense Refill     FLUoxetine (PROZAC) 10 MG capsule Take 1 capsule (10 mg) by mouth daily 30 capsule 0     lamoTRIgine (LAMICTAL) 200 MG tablet Take 1 tablet (200 mg) by mouth daily 90 tablet 1  "    OLANZapine (ZYPREXA) 5 MG tablet Take 1 tablet (5 mg) by mouth At Bedtime 90 tablet 1     lithium (ESKALITH) 450 MG CR tablet Take 1 tablet (450 mg) by mouth daily 30 tablet 1     propranolol (INDERAL) 10 MG tablet Take 1 tablet (10 mg) by mouth 2 times daily 60 tablet 5     pseudoePHEDrine (SUDAFED) 30 MG tablet Take 1 tablet (30 mg) by mouth as needed for congestion       tobramycin (TOBREX) 0.3 % ophthalmic solution Place 1 drop into the right eye 4 times daily       lithium (ESKALITH/LITHOBID) 300 MG CR tablet Take 1 tablet (300 mg) by mouth daily (Patient not taking: Reported on 3/27/2018)       acetaminophen (TYLENOL) 325 MG tablet Take 2 tablets (650 mg) by mouth every 4 hours as needed for mild pain or headaches (Patient not taking: Reported on 3/27/2018) 100 tablet 0     miconazole (MICATIN) 2 % cream Apply topically 2 times daily as needed (rash) (Patient not taking: Reported on 3/27/2018) 45 g 0     cholecalciferol 1000 UNITS TABS Take 1,000 Units by mouth daily (Patient not taking: Reported on 2/28/2018) 30 tablet 0     fluticasone (FLONASE) 50 MCG/ACT nasal spray Spray 2 sprays into both nostrils daily (Patient not taking: Reported on 2/28/2018) 16 g 5     [DISCONTINUED] QUEtiapine (SEROQUEL) 25 MG tablet Take 2-4 tablets at bedtime to address insomnia and sleep concerns  Take 1 tablet twice daily as needed for anxiety management 100 tablet 0     No current Epic-ordered facility-administered medications on file.         Mental Status Exam     Appearance:  Casually dressed and Adequately groomed  Behavior/relationship to examiner/demeanor:  Cooperative  Orientation: Oriented to person, place, time and situation  Psychomotor: normal form  Speech Rate:  Normal  Speech Spontaneity:  Mild latency and poverty  Mood:  \"I'm not sure\"  Affect:  Anxious/Nervous  Thought Process (Associations):  Circumstantial and Perseverative  Thought Content:  no overt psychosis, patient does not appear to be responding to " internal stimuli, Suicidal ideation and denies suicidal intent or plan  Abnormal Perception:  Depersonalization  Attention/Concentration:  Normal  Insight:  Fair  Judgment:  Adequate for safety      Results     Vital signs: /80  Pulse 80  Wt 98.2 kg (216 lb 6.4 oz)  BMI 28.75 kg/m2    Laboratory Data:  reviewed from [facility]    Assessment & Plan      Brian Gaitan is seen today for follow up and reports he has not moved forward with trying to work. While visiting family in [place] he went to [place] and gambled losing $5,000. He feels very quilty about this and doesn't want his family to know.  Is establishing care with a therapist who is considering autism diagnosis. We discussed symptoms that might meet this diagnosis but also possibility of personality disorder such as avoidant, schizoid.  Since addition of Fluoxetine may have increased somatic anxiety and has returned to [facility] to consider low testosterone.    Diagnosis  Anxiety with somatic features, Major Depression Recurrent severe, ADHD inattentive type, Episodic compulsive gambling      Plan:  Medication: Increase Fluxoxetine to 20mg, continue all other medications  OTC Recommendations: none  Lab Orders:  None, Will order lithium labs at next appointment.  Referrals: none  Release of Information:none needed.  Future Treatment Considerations:per symptoms  Return for Follow Up:4 weeks   The risks, benefits, alternatives and side effects have been discussed and are understood by the patient. The patient understands the risks of using street drugs or alcohol. There are no medical contraindications, the patient agrees to treatment, and has the capacity to do so. The patient understands to call 911 or come to the nearest ED if life threatening or urgent symptoms present.  In addition time was spent counseling the patient and/or coordinating care regarding review of social and occupational functioning.  In addition patient was counseled on  health and wellness practices to augment medication treatment of symptoms. See note for details.    Yenny Call, APRN CNS 3/27/2018

## 2018-03-29 ENCOUNTER — MYC MEDICAL ADVICE (OUTPATIENT)
Dept: PSYCHIATRY | Facility: CLINIC | Age: 38
End: 2018-03-29

## 2018-03-29 DIAGNOSIS — F33.2 MAJOR DEPRESSIVE DISORDER, RECURRENT, SEVERE WITHOUT PSYCHOTIC FEATURES (H): ICD-10-CM

## 2018-03-29 DIAGNOSIS — F45.9 SOMATIC PREOCCUPATION: ICD-10-CM

## 2018-03-29 DIAGNOSIS — F41.9 ANXIETY: ICD-10-CM

## 2018-04-11 ASSESSMENT — PATIENT HEALTH QUESTIONNAIRE - PHQ9: SUM OF ALL RESPONSES TO PHQ QUESTIONS 1-9: 20

## 2018-04-25 ENCOUNTER — TELEPHONE (OUTPATIENT)
Dept: PSYCHIATRY | Facility: CLINIC | Age: 38
End: 2018-04-25

## 2018-04-25 ENCOUNTER — OFFICE VISIT (OUTPATIENT)
Dept: PSYCHIATRY | Facility: CLINIC | Age: 38
End: 2018-04-25
Attending: CLINICAL NURSE SPECIALIST
Payer: COMMERCIAL

## 2018-04-25 VITALS
SYSTOLIC BLOOD PRESSURE: 126 MMHG | BODY MASS INDEX: 28.72 KG/M2 | WEIGHT: 216.2 LBS | DIASTOLIC BLOOD PRESSURE: 82 MMHG | HEART RATE: 61 BPM

## 2018-04-25 DIAGNOSIS — F41.9 ANXIETY: Primary | ICD-10-CM

## 2018-04-25 DIAGNOSIS — F33.2 MAJOR DEPRESSIVE DISORDER, RECURRENT, SEVERE WITHOUT PSYCHOTIC FEATURES (H): ICD-10-CM

## 2018-04-25 DIAGNOSIS — F45.9 SOMATIC PREOCCUPATION: ICD-10-CM

## 2018-04-25 DIAGNOSIS — F63.0 GAMBLING DISORDER, EPISODIC: ICD-10-CM

## 2018-04-25 PROCEDURE — G0463 HOSPITAL OUTPT CLINIC VISIT: HCPCS | Mod: ZF

## 2018-04-25 ASSESSMENT — PAIN SCALES - GENERAL: PAINLEVEL: SEVERE PAIN (7)

## 2018-04-25 NOTE — PROGRESS NOTES
Outpatient Psychiatry Progress Note     Provider: ABBY Contreras CNS  Date: 2018  Service:  Medication follow up with counseling.   Patient Identification: Brian Gaitan  : 1980   MRN: [id number]    Brian Gaitan is a 37 year old year old male who presents for ongoing psychiatric care.  Brian Gaitan was last seen in clinic on 3/27/18.   At that time,   Assessment & Plan       Brian Gaitan is seen today for follow up and reports he has not moved forward with trying to work. While visiting family in [place] he went to [place] and gambled losing $5,000. He feels very quilty about this and doesn't want his family to know.  Is establishing care with a therapist who is considering autism diagnosis. We discussed symptoms that might meet this diagnosis but also possibility of personality disorder such as avoidant, schizoid.  Since addition of Fluoxetine may have increased somatic anxiety and has returned to [facility] to consider low testosterone.     Diagnosis  Anxiety with somatic features, Major Depression Recurrent severe, ADHD inattentive type, Episodic compulsive gambling        Plan:  Medication: Increase Fluxoxetine to 20mg, continue all other medications  OTC Recommendations: none  Lab Orders:  None, Will order lithium labs at next appointment.  Referrals: none  Release of Information:none needed.  Future Treatment Considerations:per symptoms  Return for Follow Up:4 weeks      ____________________________________________________________________________________________________________________________________________  See also My Chart message from 18.  2018  Today Brian reports since on Fluoxetine has more fatigue, less motivated.    Continues to [place], stating it has been ongoing over the last 8 to 10 months.  In the last 4 months has lost $40,000 to $50,000. He had not been honest about the amount before. He is on the hook for that much but in the last year has gambled  more like $100,000.   Hasn't seen therapist because he was on a [employer] cruise cruise he won at a [employer]. His parents haven't known how much he has gambled until recently.  Still working with [employer].   Things that have gotten better over 3 years include being able drive, anxiety is not constant or debilitating, able to converse better, less somatic perseveration.   Side effects of medication include: no change.  Psychiatric Review of Systems:  Depression: In the last 2 weeks per PHQ 9 several days appetite disturbance, restless/lethargy. More than half days feelings of failure, passive SI.  Nearly everyday anhedonia, feeling depressed, sleep disturbance, fatigue, concentration problems.  Anxiety : continued  Maegan na   Psychosis  na.   ADHD: no change    Review of Medical Systems:  Sleep: no change  Energy: low  Concentration: no change  Appetite: no change.   GI Concerns: no new concerns  Cardiac concerns: no new concerns  Neurological concerns: no new concerns  Other medical concerns: no new concerns  Current Substance Use:  Alcohol:denies frequent use or abuse  Other drugs:denies  Caffeine:none  Nicotine: none  Past Medical History:   Past Medical History:   Diagnosis Date     Bundle branch block, right      Chronic back pain      Generalized anxiety disorder      Insomnia      Major depressive disorder, recurrent episode, moderate (H) 9/1/2011     Perforation of tympanic membrane, unspecified age 23     Seasonal allergies      Patient Active Problem List   Diagnosis     CARDIOVASCULAR SCREENING; LDL GOAL LESS THAN 160     Sleep problems     Cognitive complaints     ADHD (attention deficit hyperactivity disorder), inattentive type     Anxiety     Major depressive disorder, recurrent, severe without psychotic features (H)     Right bundle branch block (RBBB) on electrocardiogram (ECG)     Somatic preoccupation     Sinus congestion     Suicidal ideation     Gambling disorder, episodic       Allergies:    Allergies   Allergen Reactions     Penicillins Unknown and Rash          Current Medications     Current Outpatient Prescriptions Ordered in [facility]   Medication Sig Dispense Refill     FLUoxetine (PROZAC) 20 MG capsule Take 1 capsule (20 mg) by mouth daily 30 capsule 1     lamoTRIgine (LAMICTAL) 200 MG tablet Take 1 tablet (200 mg) by mouth daily 90 tablet 1     lithium (ESKALITH) 450 MG CR tablet Take 1 tablet (450 mg) by mouth daily 30 tablet 1     OLANZapine (ZYPREXA) 5 MG tablet Take 1 tablet (5 mg) by mouth At Bedtime 90 tablet 1     propranolol (INDERAL) 10 MG tablet Take 1 tablet (10 mg) by mouth 2 times daily 60 tablet 5     pseudoePHEDrine (SUDAFED) 30 MG tablet Take 1 tablet (30 mg) by mouth as needed for congestion       tobramycin (TOBREX) 0.3 % ophthalmic solution Place 1 drop into the right eye 4 times daily       acetaminophen (TYLENOL) 325 MG tablet Take 2 tablets (650 mg) by mouth every 4 hours as needed for mild pain or headaches (Patient not taking: Reported on 3/27/2018) 100 tablet 0     cholecalciferol 1000 UNITS TABS Take 1,000 Units by mouth daily (Patient not taking: Reported on 2/28/2018) 30 tablet 0     fluticasone (FLONASE) 50 MCG/ACT nasal spray Spray 2 sprays into both nostrils daily (Patient not taking: Reported on 2/28/2018) 16 g 5     miconazole (MICATIN) 2 % cream Apply topically 2 times daily as needed (rash) (Patient not taking: Reported on 3/27/2018) 45 g 0     [DISCONTINUED] QUEtiapine (SEROQUEL) 25 MG tablet Take 2-4 tablets at bedtime to address insomnia and sleep concerns  Take 1 tablet twice daily as needed for anxiety management 100 tablet 0     No current Epic-ordered facility-administered medications on file.         Mental Status Exam     Appearance:  Casually dressed and Well groomed  Behavior/relationship to examiner/demeanor:  Cooperative  Orientation: Oriented to person, place, time and situation  Psychomotor: normal form  Speech Rate:  Normal  Speech Spontaneity:   "Normal  Mood:  \"there more stress\"  Affect:  Dysphoric and Anxious/Nervous  Thought Process (Associations):  Circumstantial  Thought Content:  no overt psychosis, patient does not appear to be responding to internal stimuli, Suicidal ideation and denies suicidal intent or plan  Abnormal Perception:  None  Attention/Concentration:  Normal  Insight:  Limited  Judgment:  Adequate for safety      Results     Vital signs: /82  Pulse 61  Wt 98.1 kg (216 lb 3.2 oz)  BMI 28.72 kg/m2    Laboratory Data:  no new data    Assessment & Plan      Brian Gaitan is seen today for follow up and reports he had been gambling more frequently than he previously had reported and is now in debt of around $50,000. He won a [employer] cruise through gambling and took his father with him and gambled losing money while on the cruise without his father's knowledge. Has not been seeing therapist regularly.  He does not think that Fluoxetine added to compulsive gambling and feels that it is an addiction. Discussed that I feel strongly he is able to work at least a part time job and that it should be structured with lose stress but that he is not isolated while working. He agrees to trying finding work on his own if needed instead of through the work force which he reports has been slow to help him with employment.  Patient also plans to attend gamblers anonymous regularly and find a sponsor there as well as follow up with his therapist.  Agrees to plan to discontinue Fluoxetine since it has not helped in positive functioning.  He has reported that Luvox had maybe been cause of gambling but now admits that gambling has been a problem for much longer that association with medication.  Agrees that he is safe from self harm at this time and feels that suicide would only worsen the burden he has made for his parents.    Diagnosis  Anxiety with somatic features, Major Depression Recurrent severe, ADHD inattentive type, Episodic compulsive " gambling    Plan:  Medication: Discontinue Fluoxetine, Consider Naltrexone for gambling  OTC Recommendations: none  Lab Orders:    Referrals: none  Release of Information: none  Future Treatment Considerations:naltrexone for gambling  Return for Follow Up:4 weeks   The risks, benefits, alternatives and side effects have been discussed and are understood by the patient. The patient understands the risks of using street drugs or alcohol. There are no medical contraindications, the patient agrees to treatment, and has the capacity to do so. The patient understands to call 911 or come to the nearest ED if life threatening or urgent symptoms present.  In addition time was spent counseling the patient and/or coordinating care regarding review of social and occupational functioning.  In addition patient was counseled on health and wellness practices to augment medication treatment of symptoms. See note for details.    Yenny Call, APRN CNS 4/25/2018

## 2018-04-25 NOTE — MR AVS SNAPSHOT
After Visit Summary   4/25/2018    Brian Gaitan    MRN: [id number]           Patient Information     Date Of Birth          1980        Visit Information        Provider Department      4/25/2018 4:15 PM Yenny Call APRN [facility]        Today's Diagnoses     Anxiety    -  1    Major depressive disorder, recurrent, severe without psychotic features (H)        Gambling disorder, episodic        Somatic preoccupation           Follow-ups after your visit        Follow-up notes from your care team     Return in about 4 weeks (around 5/23/2018).      Your next 10 appointments already scheduled     May 30, 2018  5:15 PM CDT   Adult Med Follow UP with ABBY Contreras CNS   Psychiatry Clinic ([facility])    [place] [id number] [address] [phone]              Who to contact     Please call your clinic at [phone] to:    Ask questions about your health    Make or cancel appointments    Discuss your medicines    Learn about your test results    Speak to your doctor            Additional Information About Your Visit        MyChart Information     [employer] gives you secure access to your electronic health record. If you see a primary care provider, you can also send messages to your care team and make appointments. If you have questions, please call your primary care clinic.  If you do not have a primary care provider, please call [phone] and they will assist you.      [employer] is an electronic gateway that provides easy, online access to your medical records. With [employer], you can request a clinic appointment, read your test results, renew a prescription or communicate with your care team.     To access your existing account, please contact your [facility] or call [phone] for assistance.        Care EveryWhere ID     This is your Care EveryWhere ID. This could be  used by other organizations to access your [facility] medical records  [id number]        Your Vitals Were     Pulse BMI (Body Mass Index)                61 28.72 kg/m2           Blood Pressure from Last 3 Encounters:   04/25/18 126/82   03/27/18 112/80   02/28/18 129/87    Weight from Last 3 Encounters:   04/25/18 98.1 kg (216 lb 3.2 oz)   03/27/18 98.2 kg (216 lb 6.4 oz)   02/28/18 99.2 kg (218 lb 9.6 oz)              Today, you had the following     No orders found for display         Today's Medication Changes          These changes are accurate as of 4/25/18 11:59 PM.  If you have any questions, ask your nurse or doctor.               Stop taking these medicines if you haven't already. Please contact your care team if you have questions.     FLUoxetine 20 MG capsule   Commonly known as:  PROzac   Stopped by:  Yenny Call APRN CNS                    Primary Care Provider Office Phone # Fax #    Barbara Damico [phone]       [address] [place] [address]        Equal Access to Services     [facility]: Hadii alex Machado, wamirzada alok, qamaria d le, sourav mackay . So [facility] [phone].    ATENCIÓN: Si habla español, tiene a yao disposición servicios gratuitos de asistencia lingüística. [facility] [phone].    We comply with applicable federal civil rights laws and [place] laws. We do not discriminate on the basis of race, color, national origin, age, disability, sex, sexual orientation, or gender identity.            Thank you!     Thank you for choosing PSYCHIATRY CLINIC  for your care. Our goal is always to provide you with excellent care. Hearing back from our patients is one way we can continue to improve our services. Please take a few minutes to complete the written survey that you may receive in the mail after your visit with us. Thank you!             Your Updated Medication List - Protect others  around you: Learn how to safely use, store and throw away your medicines at www.disposemymeds.org.          This list is accurate as of 4/25/18 11:59 PM.  Always use your most recent med list.                   Brand Name Dispense Instructions for use Diagnosis    acetaminophen 325 MG tablet    TYLENOL    100 tablet    Take 2 tablets (650 mg) by mouth every 4 hours as needed for mild pain or headaches    Major depressive disorder, recurrent, severe without psychotic features (H)       cholecalciferol 1000 units Tabs     30 tablet    Take 1,000 Units by mouth daily    Major depressive disorder, recurrent, severe without psychotic features (H)       fluticasone 50 MCG/ACT spray    FLONASE    16 g    Spray 2 sprays into both nostrils daily    Nasal congestion       lamoTRIgine 200 MG tablet    LAMICTAL    90 tablet    Take 1 tablet (200 mg) by mouth daily    Major depressive disorder, recurrent, severe without psychotic features (H)       lithium 450 MG CR tablet    ESKALITH    30 tablet    Take 1 tablet (450 mg) by mouth daily    Major depressive disorder, recurrent, severe without psychotic features (H)       miconazole 2 % cream    MICATIN    45 g    Apply topically 2 times daily as needed (rash)    Major depressive disorder, recurrent, severe without psychotic features (H)       OLANZapine 5 MG tablet    zyPREXA    90 tablet    Take 1 tablet (5 mg) by mouth At Bedtime    Major depressive disorder, recurrent, severe without psychotic features (H)       propranolol 10 MG tablet    INDERAL    60 tablet    Take 1 tablet (10 mg) by mouth 2 times daily    Anxiety       pseudoePHEDrine 30 MG tablet    SUDAFED     Take 1 tablet (30 mg) by mouth as needed for congestion        tobramycin 0.3 % ophthalmic solution    TOBREX     Place 1 drop into the right eye 4 times daily

## 2018-04-26 ENCOUNTER — TELEPHONE (OUTPATIENT)
Dept: PSYCHIATRY | Facility: CLINIC | Age: 38
End: 2018-04-26

## 2018-04-26 NOTE — TELEPHONE ENCOUNTER
Writer located [facility] forms completed and faxed by provider on 3/9/18 at 1:20 pm.      Forms include:  -Signed JANET  -Request for Continuance of Waiver Benefits  -Waiver Benefit    Original forms sent to scanning

## 2018-04-27 ASSESSMENT — PATIENT HEALTH QUESTIONNAIRE - PHQ9: SUM OF ALL RESPONSES TO PHQ QUESTIONS 1-9: 21

## 2018-05-30 ENCOUNTER — OFFICE VISIT (OUTPATIENT)
Dept: PSYCHIATRY | Facility: CLINIC | Age: 38
End: 2018-05-30
Attending: CLINICAL NURSE SPECIALIST
Payer: COMMERCIAL

## 2018-05-30 VITALS
DIASTOLIC BLOOD PRESSURE: 78 MMHG | WEIGHT: 220.4 LBS | BODY MASS INDEX: 29.28 KG/M2 | SYSTOLIC BLOOD PRESSURE: 112 MMHG | HEART RATE: 87 BPM

## 2018-05-30 DIAGNOSIS — F33.2 MAJOR DEPRESSIVE DISORDER, RECURRENT, SEVERE WITHOUT PSYCHOTIC FEATURES (H): ICD-10-CM

## 2018-05-30 DIAGNOSIS — F45.9 SOMATIC PREOCCUPATION: ICD-10-CM

## 2018-05-30 DIAGNOSIS — F41.9 ANXIETY: ICD-10-CM

## 2018-05-30 DIAGNOSIS — F63.0 GAMBLING DISORDER, EPISODIC: ICD-10-CM

## 2018-05-30 DIAGNOSIS — F90.0 ADHD (ATTENTION DEFICIT HYPERACTIVITY DISORDER), INATTENTIVE TYPE: Primary | ICD-10-CM

## 2018-05-30 PROCEDURE — G0463 HOSPITAL OUTPT CLINIC VISIT: HCPCS | Mod: ZF

## 2018-05-30 RX ORDER — OLANZAPINE 5 MG/1
5 TABLET ORAL AT BEDTIME
Qty: 90 TABLET | Refills: 1 | Status: SHIPPED | OUTPATIENT
Start: 2018-05-30

## 2018-05-30 RX ORDER — CLOMIPHENE CITRATE 50 MG/1
25 TABLET ORAL DAILY
COMMUNITY
Start: 2018-05-30

## 2018-05-30 RX ORDER — LITHIUM CARBONATE 450 MG
450 TABLET, EXTENDED RELEASE ORAL DAILY
Qty: 30 TABLET | Refills: 0 | Status: SHIPPED | OUTPATIENT
Start: 2018-05-30 | End: 2018-06-26

## 2018-05-30 ASSESSMENT — PAIN SCALES - GENERAL: PAINLEVEL: SEVERE PAIN (7)

## 2018-05-30 NOTE — PROGRESS NOTES
Outpatient Psychiatry Progress Note     Provider: ABBY Contreras CNS  Date: 2018  Service:  Medication follow up with counseling.   Patient Identification: Brian Gaitan  : 1980   MRN: [id number]    Brian Gaitan is a 37 year old year old male who presents for ongoing psychiatric care.  Brian Gaitan was last seen in clinic on 18.   At that time,   Assessment & Plan       Brian Gaitan is seen today for follow up and reports he had been gambling more frequently than he previously had reported and is now in debt of around $50,000. He won a [employer] cruise through gambling and took his father with him and gambled losing money while on the cruise without his father's knowledge. Has not been seeing therapist regularly.  He does not think that Fluoxetine added to compulsive gambling and feels that it is an addiction. Discussed that I feel strongly he is able to work at least a part time job and that it should be structured with lose stress but that he is not isolated while working. He agrees to trying finding work on his own if needed instead of through the work force which he reports has been slow to help him with employment.  Patient also plans to attend [facility] regularly and find a sponsor there as well as follow up with his therapist.  Agrees to plan to discontinue Fluoxetine since it has not helped in positive functioning.  He has reported that Luvox had maybe been cause of gambling but now admits that gambling has been a problem for much longer that association with medication.  Agrees that he is safe from self harm at this time and feels that suicide would only worsen the burden he has made for his parents.     Diagnosis  Anxiety with somatic features, Major Depression Recurrent severe, ADHD inattentive type, Episodic compulsive gambling     Plan:  Medication: Discontinue Fluoxetine, Consider Naltrexone for gambling  OTC Recommendations: none  Lab Orders:    Referrals:  none  Release of Information: none  Future Treatment Considerations:naltrexone for gambling  Return for Follow Up:4 weeks      ____________________________________________________________________________________________________________________________________________    05/30/2018   Today Brian reports he attends [facility] twice a week and seeing addiction counselor once a week. Was able to get help through [employer] so he can reduced interest rate and pay off gambling debt in 4 years.   Has not gambled for 2 weeks.  Also continues to see general therapist last on 4/25/18.   Has some job leads through Work force.  He is doing some lawn mowing for friends.   Is dating a women he met on the cruise he won gambling. She lives in [place].  She is  but still .  Since of Prozac he now realized it was making him fatigued and he is getting out of bed a few hours earlier.   He is taking clomid now for low testosterone. Started it about 2 weeks ago.   Side effects of medication include: none known  Psychiatric Review of Systems:  Depression: In the last 2 weeks per PHQ 9 several days feelings of failure. More than half days the days SI but denies plan or intent. Nearly everyday anhedonia, feeling depressed, sleep disturbance, fatigue, concentration problems.  Anxiety : no change  Maegan none   Psychosis  none.   ADHD no change    Review of Medical Systems:  Sleep: problems falling asleep. Getting up in the am a little earlier  Energy: continued low but better   Concentration: no change  Appetite: no change  GI Concerns: no  Cardiac concerns: none  Neurological concerns: none  Other medical concerns: no new concerns. Started Clomid for testosterone of 299 about 2 weeks ago.   Current Substance Use:  Alcohol:denies frequent use or abuse  Other drugs:denies  Caffeine:not reviewed  Nicotine: none  Past Medical History:   Past Medical History:   Diagnosis Date     Bundle branch block, right      Chronic back  pain      Generalized anxiety disorder      Insomnia      Major depressive disorder, recurrent episode, moderate (H) 9/1/2011     Perforation of tympanic membrane, unspecified age 23     Seasonal allergies      Patient Active Problem List   Diagnosis     CARDIOVASCULAR SCREENING; LDL GOAL LESS THAN 160     Sleep problems     Cognitive complaints     ADHD (attention deficit hyperactivity disorder), inattentive type     Anxiety     Major depressive disorder, recurrent, severe without psychotic features (H)     Right bundle branch block (RBBB) on electrocardiogram (ECG)     Somatic preoccupation     Sinus congestion     Suicidal ideation     Gambling disorder, episodic       Allergies:   Allergies   Allergen Reactions     Penicillins Unknown and Rash          Current Medications     Current Outpatient Prescriptions Ordered in [facility]   Medication Sig Dispense Refill     acetaminophen (TYLENOL) 325 MG tablet Take 2 tablets (650 mg) by mouth every 4 hours as needed for mild pain or headaches (Patient not taking: Reported on 3/27/2018) 100 tablet 0     cholecalciferol 1000 UNITS TABS Take 1,000 Units by mouth daily (Patient not taking: Reported on 2/28/2018) 30 tablet 0     fluticasone (FLONASE) 50 MCG/ACT nasal spray Spray 2 sprays into both nostrils daily (Patient not taking: Reported on 2/28/2018) 16 g 5     lamoTRIgine (LAMICTAL) 200 MG tablet Take 1 tablet (200 mg) by mouth daily 90 tablet 1     lithium (ESKALITH) 450 MG CR tablet Take 1 tablet (450 mg) by mouth daily 30 tablet 1     miconazole (MICATIN) 2 % cream Apply topically 2 times daily as needed (rash) (Patient not taking: Reported on 3/27/2018) 45 g 0     OLANZapine (ZYPREXA) 5 MG tablet Take 1 tablet (5 mg) by mouth At Bedtime 90 tablet 1     propranolol (INDERAL) 10 MG tablet Take 1 tablet (10 mg) by mouth 2 times daily 60 tablet 5     pseudoePHEDrine (SUDAFED) 30 MG tablet Take 1 tablet (30 mg) by mouth as needed for congestion       tobramycin (TOBREX) 0.3  "% ophthalmic solution Place 1 drop into the right eye 4 times daily       [DISCONTINUED] QUEtiapine (SEROQUEL) 25 MG tablet Take 2-4 tablets at bedtime to address insomnia and sleep concerns  Take 1 tablet twice daily as needed for anxiety management 100 tablet 0     No current Epic-ordered facility-administered medications on file.         Mental Status Exam     Appearance:  Casually dressed and Adequately groomed  Behavior/relationship to examiner/demeanor:  Cooperative  Orientation: Oriented to person, place, time and situation  Psychomotor: normal form  Speech Rate:  Normal  Speech Spontaneity:  Normal  Mood:  \"about the same\"  Affect:  Blunted/Flat and Anxious/Nervous  Thought Process (Associations):  Goal directed and Circumstantial  Thought Content:  no overt psychosis, patient does not appear to be responding to internal stimuli, Suicidal ideation and denies suicidal intent or plan  Abnormal Perception:  None  Attention/Concentration:  Normal  Insight:  Fair  Judgment:  Adequate for safety      Results     Vital signs: /78  Pulse 87  Wt 100 kg (220 lb 6.4 oz)  BMI 29.28 kg/m2    Laboratory Data:  reviewed from [facility]    Assessment & Plan      Brian Gaitan is seen today for follow up and reports his mood is about the same but has been more active. Seeing therapist and also addiction therapist. Started Clomid for testosterone of 299.  Denies risk of harm to self or others.    Diagnosis  Anxiety with somatic features, Major Depression Recurrent severe, ADHD inattentive type, Episodic compulsive gambling      Plan:  Medication: Decrease Lamictal to 1/2 tablet for 2 weeks then discontinue. Continue all other medication.  OTC Recommendations: none  Lab Orders:  none  Referrals: none  Release of Information: none  Future Treatment Considerations:per symptoms  Return for Follow Up: 4 weeks   The risks, benefits, alternatives and side effects have been discussed and are understood by the patient. " The patient understands the risks of using street drugs or alcohol. There are no medical contraindications, the patient agrees to treatment, and has the capacity to do so. The patient understands to call 911 or come to the nearest ED if life threatening or urgent symptoms present.  In addition time was spent counseling the patient and/or coordinating care regarding review of social and occupational functioning.  In addition patient was counseled on health and wellness practices to augment medication treatment of symptoms. See note for details.    Yenny Call, APRN CNS 5/30/2018

## 2018-05-30 NOTE — MR AVS SNAPSHOT
After Visit Summary   5/30/2018    Brian Gaitan    MRN: [id number]           Patient Information     Date Of Birth          1980        Visit Information        Provider Department      5/30/2018 5:15 PM Yenny Call APRN [facility]        Today's Diagnoses     ADHD (attention deficit hyperactivity disorder), inattentive type    -  1    Anxiety        Major depressive disorder, recurrent, severe without psychotic features (H)        Somatic preoccupation        Gambling disorder, episodic        Major depressive disorder, recurrent, severe without psychotic features           Follow-ups after your visit        Your next 10 appointments already scheduled     Jun 26, 2018  5:15 PM CDT   Adult Med Follow UP with ABBY Contreras CNS   Psychiatry Clinic ([facility])    [place] [id number] [address] [phone]              Who to contact     Please call your clinic at [phone] to:    Ask questions about your health    Make or cancel appointments    Discuss your medicines    Learn about your test results    Speak to your doctor            Additional Information About Your Visit        MyChart Information     [employer] gives you secure access to your electronic health record. If you see a primary care provider, you can also send messages to your care team and make appointments. If you have questions, please call your primary care clinic.  If you do not have a primary care provider, please call [phone] and they will assist you.      [employer] ([employer]) is an electronic gateway that provides easy, online access to your medical records. With [employer]), you can request a clinic appointment, read your test results, renew a prescription or communicate with your care team.     To access your existing account, please contact your [facility] or call [phone] for assistance.        Care  EveryWhere ID     This is your Care EveryWhere ID. This could be used by other organizations to access your [place] medical records  [id number]        Your Vitals Were     Pulse BMI (Body Mass Index)                87 29.28 kg/m2           Blood Pressure from Last 3 Encounters:   05/30/18 112/78   04/25/18 126/82   03/27/18 112/80    Weight from Last 3 Encounters:   05/30/18 100 kg (220 lb 6.4 oz)   04/25/18 98.1 kg (216 lb 3.2 oz)   03/27/18 98.2 kg (216 lb 6.4 oz)              Today, you had the following     No orders found for display         Where to get your medicines      These medications were sent to [facility] [address] [place] [address] S AT [facility]  [address] [facility] [address], [facility] [address]     Phone:  [phone]     lithium 450 MG CR tablet    OLANZapine 5 MG tablet          Primary Care Provider Office Phone # Fax #    Barbara Damico [phone]       [address] [place] [address]        Equal Access to Services     BERNARDINO KIM AH: Hadii aad ku hadasho Soatulali, waaxda luqadaha, qaybta kaalmada adeegyada, sourav donohue. So [facility] [phone].    ATENCIÓN: Si habla español, tiene a yao disposición servicios gratuitos de asistencia lingüística. Maame al [phone].    We comply with applicable federal civil rights laws and [place] laws. We do not discriminate on the basis of race, color, national origin, age, disability, sex, sexual orientation, or gender identity.            Thank you!     Thank you for choosing PS[facility]  for your care. Our goal is always to provide you with excellent care. Hearing back from our patients is one way we can continue to improve our services. Please take a few minutes to complete the written survey that you may receive in the mail after your visit with us. Thank you!             Your Updated Medication List - Protect others around you:  Learn how to safely use, store and throw away your medicines at www.disposemymeds.org.          This list is accurate as of 5/30/18  5:56 PM.  Always use your most recent med list.                   Brand Name Dispense Instructions for use Diagnosis    acetaminophen 325 MG tablet    TYLENOL    100 tablet    Take 2 tablets (650 mg) by mouth every 4 hours as needed for mild pain or headaches    Major depressive disorder, recurrent, severe without psychotic features (H)       cholecalciferol 1000 units Tabs     30 tablet    Take 1,000 Units by mouth daily    Major depressive disorder, recurrent, severe without psychotic features (H)       clomiPHENE 50 MG tablet    CLOMID     Take 0.5 tablets (25 mg) by mouth daily For low testerone        fluticasone 50 MCG/ACT spray    FLONASE    16 g    Spray 2 sprays into both nostrils daily    Nasal congestion       lamoTRIgine 200 MG tablet    LAMICTAL    90 tablet    Take 1 tablet (200 mg) by mouth daily    Major depressive disorder, recurrent, severe without psychotic features (H)       lithium 450 MG CR tablet    ESKALITH    30 tablet    Take 1 tablet (450 mg) by mouth daily    Major depressive disorder, recurrent, severe without psychotic features (H)       miconazole 2 % cream    MICATIN    45 g    Apply topically 2 times daily as needed (rash)    Major depressive disorder, recurrent, severe without psychotic features (H)       OLANZapine 5 MG tablet    zyPREXA    90 tablet    Take 1 tablet (5 mg) by mouth At Bedtime    Major depressive disorder, recurrent, severe without psychotic features (H)       propranolol 10 MG tablet    INDERAL    60 tablet    Take 1 tablet (10 mg) by mouth 2 times daily    Anxiety       pseudoePHEDrine 30 MG tablet    SUDAFED     Take 1 tablet (30 mg) by mouth as needed for congestion        tobramycin 0.3 % ophthalmic solution    TOBREX     Place 1 drop into the right eye 4 times daily

## 2018-06-01 ASSESSMENT — PATIENT HEALTH QUESTIONNAIRE - PHQ9: SUM OF ALL RESPONSES TO PHQ QUESTIONS 1-9: 18

## 2018-06-08 ENCOUNTER — TELEPHONE (OUTPATIENT)
Dept: PSYCHIATRY | Facility: CLINIC | Age: 38
End: 2018-06-08

## 2018-06-08 NOTE — TELEPHONE ENCOUNTER
----- Message from Amee Ovi sent at 6/8/2018  1:05 PM CDT -----  Regarding: Taper questions - [place]  Contact: [phone]  OK to lvm  Pt lowered his Lamictal dose to 100mg and the pt wants to know if he should go 1 week or 2 weeks at the 100mg and then stop?    It has been a week since he lowered the dose, should the pt stop now or wait another full week?

## 2018-06-08 NOTE — TELEPHONE ENCOUNTER
Per last visit note:   Decrease Lamictal to 1/2 tablet for 2 weeks then discontinue.    Returned call to pt relaying that he should take lower dose (100 mg) for 2 weeks before stopping.  Pt states understanding.  Denies needing refill of medication.  Will send to provider as an FYI.

## 2018-06-26 ENCOUNTER — OFFICE VISIT (OUTPATIENT)
Dept: PSYCHIATRY | Facility: CLINIC | Age: 38
End: 2018-06-26
Attending: CLINICAL NURSE SPECIALIST
Payer: COMMERCIAL

## 2018-06-26 VITALS
DIASTOLIC BLOOD PRESSURE: 80 MMHG | HEART RATE: 89 BPM | WEIGHT: 223.6 LBS | SYSTOLIC BLOOD PRESSURE: 118 MMHG | BODY MASS INDEX: 29.7 KG/M2

## 2018-06-26 DIAGNOSIS — F45.9 SOMATIC PREOCCUPATION: ICD-10-CM

## 2018-06-26 DIAGNOSIS — F33.2 MAJOR DEPRESSIVE DISORDER, RECURRENT, SEVERE WITHOUT PSYCHOTIC FEATURES (H): ICD-10-CM

## 2018-06-26 DIAGNOSIS — F90.0 ADHD (ATTENTION DEFICIT HYPERACTIVITY DISORDER), INATTENTIVE TYPE: Primary | ICD-10-CM

## 2018-06-26 DIAGNOSIS — F63.0 GAMBLING DISORDER, EPISODIC: ICD-10-CM

## 2018-06-26 DIAGNOSIS — F41.9 ANXIETY: ICD-10-CM

## 2018-06-26 RX ORDER — LITHIUM CARBONATE 450 MG
450 TABLET, EXTENDED RELEASE ORAL DAILY
Qty: 90 TABLET | Refills: 1 | Status: SHIPPED | OUTPATIENT
Start: 2018-06-26

## 2018-06-26 ASSESSMENT — PAIN SCALES - GENERAL: PAINLEVEL: NO PAIN (0)

## 2018-06-26 NOTE — NURSING NOTE
Chief Complaint   Patient presents with     Recheck Medication     ADHD (attention deficit hyperactivity disorder)

## 2018-06-26 NOTE — PROGRESS NOTES
"  Outpatient Psychiatry Progress Note     Provider: ABBY Contreras CNS  Date: 2018  Service:  Medication follow up with counseling.   Patient Identification: Brian Gaitan  : 1980   MRN: [id number]    Brian Gaitan is a 37 year old year old male who presents for ongoing psychiatric care.  Brian Gaitan was last seen in clinic on 18.   At that time,   Assessment & Plan       Brian Gaitan is seen today for follow up and reports his mood is about the same but has been more active. Seeing therapist and also addiction therapist. Started Clomid for testosterone of 299.  Denies risk of harm to self or others.     Diagnosis  Anxiety with somatic features, Major Depression Recurrent severe, ADHD inattentive type, Episodic compulsive gambling        Plan:  Medication: Decrease Lamictal to 1/2 tablet for 2 weeks then discontinue. Continue all other medication.  OTC Recommendations: none  Lab Orders:  none  Referrals: none  Release of Information: none  Future Treatment Considerations:per symptoms  Return for Follow Up: 4 weeks      ____________________________________________________________________________________________________________________________________________    2018  Today Brian reports he is off Lamictal and feeling about the same.  Seeing gambling therapist once a week and also the psychotherapist. Still attending GA twice a week. Hasn't gambled in 3 weeks. Except he went to the [employer] for one of their promotions other wise it would be 4 weeks.     Still working on finding a job but it is up to him to complete his resume. Completing paper work for \"third opinion\" about possible underlying causes of his mental health and physical disabilities.  Therapist he seeing thinks he has autism. Discussed that I am not certain but that I think more schizod personality or similar is more likely.  Relationship with his family has been strained.   Side effects of medication include: no " change  Psychiatric Review of Systems:  Depression: In the last 2 weeks per PHQ 9 several days feelings of failure, restless/lethargy.  More than 1/2 days SI.  Nearly everyday anhedonia, feeling depressed, sleep and energy disturbance, concentration problems.   Continues to have thoughts of suicide but would not act on. Thoughts have an obsessive compulsive quality to them. If he sees a gun or knife about using them to get it over with.  Anxiety : continued somatic thinking  Maegan na   Psychosis  na.   ADHD no change    Review of Medical Systems:  Sleep: no change  Energy: no change  Concentration: no change  Appetite: stable  GI Concerns: no new concerns  Cardiac concerns: no new concerns  Neurological concerns: no new concerns  Other medical concerns: no new concerns  Current Substance Use:  Alcohol:denies frequent use or abuse  Other drugs:denies  Caffeine:not usual  Nicotine: none  Past Medical History:   Past Medical History:   Diagnosis Date     Bundle branch block, right      Chronic back pain      Generalized anxiety disorder      Insomnia      Major depressive disorder, recurrent episode, moderate (H) 9/1/2011     Perforation of tympanic membrane, unspecified age 23     Seasonal allergies      Patient Active Problem List   Diagnosis     CARDIOVASCULAR SCREENING; LDL GOAL LESS THAN 160     Sleep problems     Cognitive complaints     ADHD (attention deficit hyperactivity disorder), inattentive type     Anxiety     Major depressive disorder, recurrent, severe without psychotic features (H)     Right bundle branch block (RBBB) on electrocardiogram (ECG)     Somatic preoccupation     Sinus congestion     Suicidal ideation     Gambling disorder, episodic       Allergies:   Allergies   Allergen Reactions     Penicillins Unknown and Rash          Current Medications     Current Outpatient Prescriptions Ordered in Epic   Medication Sig Dispense Refill     acetaminophen (TYLENOL) 325 MG tablet Take 2 tablets (650 mg)  "by mouth every 4 hours as needed for mild pain or headaches (Patient not taking: Reported on 3/27/2018) 100 tablet 0     cholecalciferol 1000 UNITS TABS Take 1,000 Units by mouth daily (Patient not taking: Reported on 2/28/2018) 30 tablet 0     clomiPHENE (CLOMID) 50 MG tablet Take 0.5 tablets (25 mg) by mouth daily For low testerone       fluticasone (FLONASE) 50 MCG/ACT nasal spray Spray 2 sprays into both nostrils daily (Patient not taking: Reported on 2/28/2018) 16 g 5     lamoTRIgine (LAMICTAL) 200 MG tablet Take 1 tablet (200 mg) by mouth daily 90 tablet 1     lithium (ESKALITH) 450 MG CR tablet Take 1 tablet (450 mg) by mouth daily 30 tablet 0     miconazole (MICATIN) 2 % cream Apply topically 2 times daily as needed (rash) 45 g 0     OLANZapine (ZYPREXA) 5 MG tablet Take 1 tablet (5 mg) by mouth At Bedtime 90 tablet 1     propranolol (INDERAL) 10 MG tablet Take 1 tablet (10 mg) by mouth 2 times daily 60 tablet 5     pseudoePHEDrine (SUDAFED) 30 MG tablet Take 1 tablet (30 mg) by mouth as needed for congestion       tobramycin (TOBREX) 0.3 % ophthalmic solution Place 1 drop into the right eye 4 times daily       [DISCONTINUED] QUEtiapine (SEROQUEL) 25 MG tablet Take 2-4 tablets at bedtime to address insomnia and sleep concerns  Take 1 tablet twice daily as needed for anxiety management 100 tablet 0     No current Epic-ordered facility-administered medications on file.         Mental Status Exam     Appearance:  Casually dressed and Adequately groomed  Behavior/relationship to examiner/demeanor:  Cooperative  Orientation: Oriented to person, place, time and situation  Psychomotor: normal form  Speech Rate:  Slowed  Speech Spontaneity:  Normal  Mood:  \"the same\"  Affect:  Blunted/Flat and Anxious/Nervous  Thought Process (Associations):  Goal directed and Circumstantial  Thought Content:  no overt psychosis, patient does not appear to be responding to internal stimuli, Suicidal ideation and denies suicidal " intent or plan  Abnormal Perception:  None  Attention/Concentration:  Normal  Insight:  Fair  Judgment:  Adequate for safety      Results     Vital signs: /80  Pulse 89  Wt 101.4 kg (223 lb 9.6 oz)  BMI 29.7 kg/m2    Laboratory Data:  no new data available    Assessment & Plan      Brian Gaitan is seen today for follow up and reports he continues to have great difficulty in symptoms management. Continues to explore underlying cause of symptoms as medications and therapy have not resulted in return to previous functioning.  Reviewed criteria for Schizotypal and Schizoid PD. He has some symptoms of these but doesn't clearly meet criteria.   Encouraged that he continue current medication at this time since he is clearly improved from initial symptoms and with addition of clomid it may not be clear what cause is if symptoms reoccur.   Brian would like to have longer periods between appointments since will not be making medication changes and he doesn't live in the .  Agrees he will contact me if any worsening of symptoms and seek help if SI worsens.    Diagnosis  Anxiety with somatic features, Major Depression Recurrent severe, ADHD inattentive type, Episodic compulsive gambling      Plan:  Medication: no change  OTC Recommendations: none  Lab Orders:  none  Referrals: none   Release of Information: none  Future Treatment Considerations:per symptoms  Return for Follow Up:3 months   The risks, benefits, alternatives and side effects have been discussed and are understood by the patient. The patient understands the risks of using street drugs or alcohol. There are no medical contraindications, the patient agrees to treatment, and has the capacity to do so. The patient understands to call 911 or come to the nearest ED if life threatening or urgent symptoms present.  In addition time was spent counseling the patient and/or coordinating care regarding review of social and occupational functioning.  In addition  patient was counseled on health and wellness practices to augment medication treatment of symptoms. See note for details.    Yenny Call, APRN CNS 6/26/2018

## 2018-06-26 NOTE — MR AVS SNAPSHOT
After Visit Summary   6/26/2018    Brian Gaitan    MRN: [id number]           Patient Information     Date Of Birth          1980        Visit Information        Provider Department      6/26/2018 5:15 PM Yenny Call APRN [facility]        Today's Diagnoses     ADHD (attention deficit hyperactivity disorder), inattentive type    -  1    Anxiety        Major depressive disorder, recurrent, severe without psychotic features (H)        Somatic preoccupation        Gambling disorder, episodic           Follow-ups after your visit        Follow-up notes from your care team     Return in about 3 months (around 9/26/2018).      Who to contact     Please call your clinic at [phone] to:    Ask questions about your health    Make or cancel appointments    Discuss your medicines    Learn about your test results    Speak to your doctor            Additional Information About Your Visit        MyChart Information     [employer] gives you secure access to your electronic health record. If you see a primary care provider, you can also send messages to your care team and make appointments. If you have questions, please call your primary care clinic.  If you do not have a primary care provider, please call [phone] and they will assist you.      [employer] is an electronic gateway that provides easy, online access to your medical records. With [employer], you can request a clinic appointment, read your test results, renew a prescription or communicate with your care team.     To access your existing account, please contact your [facility] or call [phone] for assistance.        Care EveryWhere ID     This is your Care EveryWhere ID. This could be used by other organizations to access your [place] medical records  [id number]        Your Vitals Were     Pulse BMI (Body Mass Index)                89 29.7 kg/m2           Blood Pressure from Last 3 Encounters:    06/26/18 118/80   05/30/18 112/78   04/25/18 126/82    Weight from Last 3 Encounters:   06/26/18 101.4 kg (223 lb 9.6 oz)   05/30/18 100 kg (220 lb 6.4 oz)   04/25/18 98.1 kg (216 lb 3.2 oz)              Today, you had the following     No orders found for display         Today's Medication Changes          These changes are accurate as of 6/26/18 11:59 PM.  If you have any questions, ask your nurse or doctor.               Stop taking these medicines if you haven't already. Please contact your care team if you have questions.     lamoTRIgine 200 MG tablet   Commonly known as:  LAMICTAL   Stopped by:  Yenny Call APRN CNS                Where to get your medicines      These medications were sent to [facility] [address] [place] [address] RD S AT [facility] [place] [address] [facility] [address], [facility] [address]     Phone:  [phone]     lithium 450 MG CR tablet                Primary Care Provider Office Phone # Fax #    Barbara Brandie Damico [phone]        [address] [place] [address]        Equal Access to Services     BERNARDINO KIM : Hadii alex ku hadasho Soomaali, waaxda luqadaha, qaybta kaalmada adeegyada, sourav donohue. So [facility] [phone].    ATENCIÓN: Si habla español, tiene a yao disposición servicios gratuitos de asistencia lingüística. Llame al [phone].    We comply with applicable federal civil rights laws and [place] laws. We do not discriminate on the basis of race, color, national origin, age, disability, sex, sexual orientation, or gender identity.            Thank you!     Thank you for choosing [facility]  for your care. Our goal is always to provide you with excellent care. Hearing back from our patients is one way we can continue to improve our services. Please take a few minutes to complete the written survey that you may receive in the mail after your visit with us.  Thank you!             Your Updated Medication List - Protect others around you: Learn how to safely use, store and throw away your medicines at www.disposemymeds.org.          This list is accurate as of 6/26/18 11:59 PM.  Always use your most recent med list.                   Brand Name Dispense Instructions for use Diagnosis    acetaminophen 325 MG tablet    TYLENOL    100 tablet    Take 2 tablets (650 mg) by mouth every 4 hours as needed for mild pain or headaches    Major depressive disorder, recurrent, severe without psychotic features (H)       cholecalciferol 1000 units Tabs     30 tablet    Take 1,000 Units by mouth daily    Major depressive disorder, recurrent, severe without psychotic features (H)       clomiPHENE 50 MG tablet    CLOMID     Take 0.5 tablets (25 mg) by mouth daily For low testerone        fluticasone 50 MCG/ACT spray    FLONASE    16 g    Spray 2 sprays into both nostrils daily    Nasal congestion       lithium 450 MG CR tablet    ESKALITH    90 tablet    Take 1 tablet (450 mg) by mouth daily    Major depressive disorder, recurrent, severe without psychotic features (H)       miconazole 2 % cream    MICATIN    45 g    Apply topically 2 times daily as needed (rash)    Major depressive disorder, recurrent, severe without psychotic features (H)       OLANZapine 5 MG tablet    zyPREXA    90 tablet    Take 1 tablet (5 mg) by mouth At Bedtime    Major depressive disorder, recurrent, severe without psychotic features (H)       propranolol 10 MG tablet    INDERAL    60 tablet    Take 1 tablet (10 mg) by mouth 2 times daily    Anxiety       pseudoePHEDrine 30 MG tablet    SUDAFED     Take 1 tablet (30 mg) by mouth as needed for congestion        tobramycin 0.3 % ophthalmic solution    TOBREX     Place 1 drop into the right eye 4 times daily

## 2018-07-11 ASSESSMENT — PATIENT HEALTH QUESTIONNAIRE - PHQ9: SUM OF ALL RESPONSES TO PHQ QUESTIONS 1-9: 19

## 2018-08-10 ENCOUNTER — TELEPHONE (OUTPATIENT)
Dept: PSYCHIATRY | Facility: CLINIC | Age: 38
End: 2018-08-10

## 2018-08-10 NOTE — TELEPHONE ENCOUNTER
Message  Received: Yesterday       Devika Mcknight Radhika, RN       Phone Number: [phone]                     Yenny/Ashely     Patient is caller. He moved to [place] and needs to establish care there. He is hoping that a referral letter be sent to this new office since they are not taking new patients unless there is a referral. He would like a call to let him know when the referral has been sent.     Alfredo Wahl   [facility]   Fax: [phone]

## 2018-08-10 NOTE — LETTER
2018    Patient: Brian Gaitan  : 1980  Phone number: [phone]      Dear Dr. Alfredo Wahl,       Brian has relocated from [facility] and needs to establish care with a different psychiatrist near home. Patient has been treated for depression and anxiety at [facility]. Brian needs to continue psychiatric care that is available close to his new home.         Sincerely,      Yenny Call APRN CNS

## 2018-08-17 NOTE — TELEPHONE ENCOUNTER
Clinic Care Coordination - Follow-up (Referral )            Yenny Call APRN CNS   You 5 minutes ago (3:30 PM)                 Would you be able to write a letter stating that he no longer lives in the [facility] and has been treated for depression and anxiety at this clinic since ? And needs continued psychiatric care that is available closer to his home?   Thank you,   Yenny (Routing comment)                  - Letter written for a referral   - Placed in providers folder to be signed  - Will route to provider

## 2018-08-21 NOTE — TELEPHONE ENCOUNTER
- Received signed referral letter from provider   - Faxed to Dr. Alfredo Wahl at [phone]  - Patient also recommended a copy to be mailed to home    [address] [facility], [address]  - Copy of Referral mailed to patient   - Original placed in scanning

## 2019-11-04 ENCOUNTER — HEALTH MAINTENANCE LETTER (OUTPATIENT)
Age: 39
End: 2019-11-04

## 2020-11-22 ENCOUNTER — HEALTH MAINTENANCE LETTER (OUTPATIENT)
Age: 40
End: 2020-11-22

## 2021-09-19 ENCOUNTER — HEALTH MAINTENANCE LETTER (OUTPATIENT)
Age: 41
End: 2021-09-19

## 2022-01-08 ENCOUNTER — HEALTH MAINTENANCE LETTER (OUTPATIENT)
Age: 42
End: 2022-01-08

## 2022-03-29 NOTE — MR AVS SNAPSHOT
After Visit Summary   3/27/2018    Brian Gaitan    MRN: [id number]           Patient Information     Date Of Birth          1980        Visit Information        Provider Department      3/27/2018 4:45 PM Yenny Call APRN [facility]        Today's Diagnoses     ADHD (attention deficit hyperactivity disorder), inattentive type    -  1    Anxiety        Major depressive disorder, recurrent, severe without psychotic features (H)        Somatic preoccupation        Gambling disorder, episodic           Follow-ups after your visit        Follow-up notes from your care team     Return in about 4 weeks (around 4/24/2018).      Your next 10 appointments already scheduled     Apr 25, 2018  4:15 PM CDT   Adult Med Follow UP with ABBY Contreras CNS   Psychiatry Clinic ([facility])    [place] [id number] [address] [phone]              Who to contact     Please call your clinic at [phone] to:    Ask questions about your health    Make or cancel appointments    Discuss your medicines    Learn about your test results    Speak to your doctor            Additional Information About Your Visit        MyChart Information     [employer] gives you secure access to your electronic health record. If you see a primary care provider, you can also send messages to your care team and make appointments. If you have questions, please call your primary care clinic.  If you do not have a primary care provider, please call [phone] and they will assist you.      [employer] is an electronic gateway that provides easy, online access to your medical records. With [employer], you can request a clinic appointment, read your test results, renew a prescription or communicate with your care team.     To access your existing account, please contact your [facility] or call [phone] for assistance.         Care EveryWhere ID     This is your Care EveryWhere ID. This could be used by other organizations to access your [place] medical records  [id number]        Your Vitals Were     Pulse BMI (Body Mass Index)                80 28.75 kg/m2           Blood Pressure from Last 3 Encounters:   03/27/18 112/80   02/28/18 129/87   01/04/18 122/80    Weight from Last 3 Encounters:   03/27/18 98.2 kg (216 lb 6.4 oz)   02/28/18 99.2 kg (218 lb 9.6 oz)   01/04/18 100.3 kg (221 lb 3.2 oz)              Today, you had the following     No orders found for display         Today's Medication Changes          These changes are accurate as of 3/27/18 11:59 PM.  If you have any questions, ask your nurse or doctor.               These medicines have changed or have updated prescriptions.        Dose/Directions    FLUoxetine 20 MG capsule   Commonly known as:  PROzac   This may have changed:    - medication strength  - how much to take   Used for:  Major depressive disorder, recurrent, severe without psychotic features (H), Somatic preoccupation, Anxiety   Changed by:  Yenny Call APRN CNS        Dose:  20 mg   Take 1 capsule (20 mg) by mouth daily   Quantity:  30 capsule   Refills:  1       lithium 450 MG CR tablet   Commonly known as:  ESKALITH   This may have changed:  Another medication with the same name was removed. Continue taking this medication, and follow the directions you see here.   Used for:  Major depressive disorder, recurrent, severe without psychotic features (H)   Changed by:  Yenny Call APRN CNS        Dose:  450 mg   Take 1 tablet (450 mg) by mouth daily   Quantity:  30 tablet   Refills:  1            Where to get your medicines      These medications were sent to [facility] [id number] [place] [address] [address] AT [facility] & [place]  [address], [facility] [address]     Phone:  [phone]     FLUoxetine 20 MG capsule                Primary  Care Provider Office Phone # Fax #    Barbara Damico [phone] [address] [place] [address]        Equal Access to Services     BERNARDINO KIM : Hadii aad ku hadjadabobbi Dee Deeliliya, wamirzada luqjonathan, qaybta kahernan le, sourav smith selvinmylene huddlestonbritney donohue. So [facility] [phone].    ATENCIÓN: Si habla español, tiene a yao disposición servicios gratuitos de asistencia lingüística. Llame al [phone].    We comply with applicable federal civil rights laws and [place] laws. We do not discriminate on the basis of race, color, national origin, age, disability, sex, sexual orientation, or gender identity.            Thank you!     Thank you for choosing [facility]  for your care. Our goal is always to provide you with excellent care. Hearing back from our patients is one way we can continue to improve our services. Please take a few minutes to complete the written survey that you may receive in the mail after your visit with us. Thank you!             Your Updated Medication List - Protect others around you: Learn how to safely use, store and throw away your medicines at www.disposemymeds.org.          This list is accurate as of 3/27/18 11:59 PM.  Always use your most recent med list.                   Brand Name Dispense Instructions for use Diagnosis    acetaminophen 325 MG tablet    TYLENOL    100 tablet    Take 2 tablets (650 mg) by mouth every 4 hours as needed for mild pain or headaches    Major depressive disorder, recurrent, severe without psychotic features (H)       cholecalciferol 1000 UNITS Tabs     30 tablet    Take 1,000 Units by mouth daily    Major depressive disorder, recurrent, severe without psychotic features (H)       FLUoxetine 20 MG capsule    PROzac    30 capsule    Take 1 capsule (20 mg) by mouth daily    Major depressive disorder, recurrent, severe without psychotic features (H), Somatic preoccupation, Anxiety       fluticasone 50 MCG/ACT  spray    FLONASE    16 g    Spray 2 sprays into both nostrils daily    Nasal congestion       lamoTRIgine 200 MG tablet    LAMICTAL    90 tablet    Take 1 tablet (200 mg) by mouth daily    Major depressive disorder, recurrent, severe without psychotic features (H)       lithium 450 MG CR tablet    ESKALITH    30 tablet    Take 1 tablet (450 mg) by mouth daily    Major depressive disorder, recurrent, severe without psychotic features (H)       miconazole 2 % cream    MICATIN    45 g    Apply topically 2 times daily as needed (rash)    Major depressive disorder, recurrent, severe without psychotic features (H)       OLANZapine 5 MG tablet    zyPREXA    90 tablet    Take 1 tablet (5 mg) by mouth At Bedtime    Major depressive disorder, recurrent, severe without psychotic features (H)       propranolol 10 MG tablet    INDERAL    60 tablet    Take 1 tablet (10 mg) by mouth 2 times daily    Anxiety       pseudoePHEDrine 30 MG tablet    SUDAFED     Take 1 tablet (30 mg) by mouth as needed for congestion        tobramycin 0.3 % ophthalmic solution    TOBREX     Place 1 drop into the right eye 4 times daily           Nsaids Counseling: NSAID Counseling: I discussed with the patient that NSAIDs should be taken with food. Prolonged use of NSAIDs can result in the development of stomach ulcers.  Patient advised to stop taking NSAIDs if abdominal pain occurs.  The patient verbalized understanding of the proper use and possible adverse effects of NSAIDs.  All of the patient's questions and concerns were addressed.

## 2022-11-20 ENCOUNTER — HEALTH MAINTENANCE LETTER (OUTPATIENT)
Age: 42
End: 2022-11-20

## 2023-04-15 ENCOUNTER — HEALTH MAINTENANCE LETTER (OUTPATIENT)
Age: 43
End: 2023-04-15

## 2023-06-29 NOTE — Clinical Note
----- Message from Jeffery Sanchez sent at 6/29/2023 11:46 AM EDT -----  Subject: Message to Provider    QUESTIONS  Information for Provider? Patient is scheduled with Baljinder Cardona   on July 14th @ 2:40pm and is needing a   ---------------------------------------------------------------------------  --------------  [address]  [phone]; OK to leave message on voicemail  ---------------------------------------------------------------------------  --------------  SCRIPT ANSWERS  Relationship to Patient? Covered Entity  Covered Entity Type? Other  Other Covered Entity Type? Feliberto Rizvi  Representative Name?  Tani Negro Could you try and call Brian today.  He was suppose to send me a message last Thursday but didn't and I sent him a message on Friday but haven't got a response. Just want to make sure he is at least a little better and not worse.  I can contact tomorrow to consider adding medication.  Ketamine would be a no. Yenny Call CNS, APRN